# Patient Record
Sex: MALE | Race: WHITE | NOT HISPANIC OR LATINO | Employment: OTHER | URBAN - METROPOLITAN AREA
[De-identification: names, ages, dates, MRNs, and addresses within clinical notes are randomized per-mention and may not be internally consistent; named-entity substitution may affect disease eponyms.]

---

## 2017-02-19 ENCOUNTER — GENERIC CONVERSION - ENCOUNTER (OUTPATIENT)
Dept: OTHER | Facility: OTHER | Age: 62
End: 2017-02-19

## 2017-02-21 ENCOUNTER — GENERIC CONVERSION - ENCOUNTER (OUTPATIENT)
Dept: OTHER | Facility: OTHER | Age: 62
End: 2017-02-21

## 2017-03-21 ENCOUNTER — ALLSCRIPTS OFFICE VISIT (OUTPATIENT)
Dept: OTHER | Facility: OTHER | Age: 62
End: 2017-03-21

## 2017-03-21 DIAGNOSIS — D35.2 BENIGN NEOPLASM OF PITUITARY GLAND (HCC): ICD-10-CM

## 2017-03-22 ENCOUNTER — GENERIC CONVERSION - ENCOUNTER (OUTPATIENT)
Dept: OTHER | Facility: OTHER | Age: 62
End: 2017-03-22

## 2017-03-23 ENCOUNTER — GENERIC CONVERSION - ENCOUNTER (OUTPATIENT)
Dept: OTHER | Facility: OTHER | Age: 62
End: 2017-03-23

## 2017-03-23 LAB
LEFT EYE DIABETIC RETINOPATHY: NORMAL
RIGHT EYE DIABETIC RETINOPATHY: NORMAL

## 2017-03-31 ENCOUNTER — GENERIC CONVERSION - ENCOUNTER (OUTPATIENT)
Dept: OTHER | Facility: OTHER | Age: 62
End: 2017-03-31

## 2017-04-04 DIAGNOSIS — I10 ESSENTIAL (PRIMARY) HYPERTENSION: ICD-10-CM

## 2017-04-04 DIAGNOSIS — E11.9 TYPE 2 DIABETES MELLITUS WITHOUT COMPLICATIONS (HCC): ICD-10-CM

## 2017-04-04 DIAGNOSIS — D35.2 BENIGN NEOPLASM OF PITUITARY GLAND (HCC): ICD-10-CM

## 2017-04-04 DIAGNOSIS — N20.0 CALCULUS OF KIDNEY: ICD-10-CM

## 2017-04-04 DIAGNOSIS — E78.5 HYPERLIPIDEMIA: ICD-10-CM

## 2017-04-10 ENCOUNTER — GENERIC CONVERSION - ENCOUNTER (OUTPATIENT)
Dept: OTHER | Facility: OTHER | Age: 62
End: 2017-04-10

## 2017-04-17 ENCOUNTER — TRANSCRIBE ORDERS (OUTPATIENT)
Dept: ADMINISTRATIVE | Facility: HOSPITAL | Age: 62
End: 2017-04-17

## 2017-04-17 DIAGNOSIS — D35.3 BENIGN NEOPLASM OF PITUITARY GLAND AND CRANIOPHARYNGEAL DUCT (POUCH) (HCC): Primary | ICD-10-CM

## 2017-04-17 DIAGNOSIS — D35.2 BENIGN NEOPLASM OF PITUITARY GLAND AND CRANIOPHARYNGEAL DUCT (POUCH) (HCC): Primary | ICD-10-CM

## 2017-04-17 DIAGNOSIS — E22.9 PITUITARY HYPERFUNCTION (HCC): ICD-10-CM

## 2017-05-23 ENCOUNTER — GENERIC CONVERSION - ENCOUNTER (OUTPATIENT)
Dept: OTHER | Facility: OTHER | Age: 62
End: 2017-05-23

## 2017-05-23 LAB
A/G RATIO (HISTORICAL): 1.7 (ref 1.2–2.2)
ALBUMIN SERPL BCP-MCNC: 4.5 G/DL (ref 3.6–4.8)
ALP SERPL-CCNC: 54 IU/L (ref 39–117)
ALT SERPL W P-5'-P-CCNC: 25 IU/L (ref 0–44)
AMBIG ABBREV CMP14 DEFAULT (HISTORICAL): NORMAL
AMBIG ABBREV LP DEFAULT (HISTORICAL): NORMAL
AST SERPL W P-5'-P-CCNC: 17 IU/L (ref 0–40)
BILIRUB SERPL-MCNC: 0.4 MG/DL (ref 0–1.2)
BUN SERPL-MCNC: 21 MG/DL (ref 8–27)
BUN/CREA RATIO (HISTORICAL): 19 (ref 10–24)
CALCIUM SERPL-MCNC: 9.7 MG/DL (ref 8.6–10.2)
CHLORIDE SERPL-SCNC: 99 MMOL/L (ref 96–106)
CHOLEST SERPL-MCNC: 164 MG/DL (ref 100–199)
CO2 SERPL-SCNC: 21 MMOL/L (ref 18–29)
CREAT SERPL-MCNC: 1.13 MG/DL (ref 0.76–1.27)
EGFR AFRICAN AMERICAN (HISTORICAL): 81 ML/MIN/1.73
EGFR-AMERICAN CALC (HISTORICAL): 70 ML/MIN/1.73
GLUCOSE SERPL-MCNC: 152 MG/DL (ref 65–99)
HDLC SERPL-MCNC: 45 MG/DL
LDLC SERPL CALC-MCNC: 101 MG/DL (ref 0–99)
POTASSIUM SERPL-SCNC: 4.3 MMOL/L (ref 3.5–5.2)
SODIUM SERPL-SCNC: 138 MMOL/L (ref 134–144)
TOT. GLOBULIN, SERUM (HISTORICAL): 2.7 G/DL (ref 1.5–4.5)
TOTAL PROTEIN (HISTORICAL): 7.2 G/DL (ref 6–8.5)
TRIGL SERPL-MCNC: 89 MG/DL (ref 0–149)
VLDLC SERPL CALC-MCNC: 18 MG/DL (ref 5–40)

## 2017-05-24 LAB
COMMENT (HISTORICAL): NORMAL
CORTIS AM PEAK SERPL-SCNC: 7.5 UG/DL (ref 6.2–19.4)
PROLACTIN (HISTORICAL): 0.4 NG/ML (ref 4–15.2)
PTH-INTACT SERPL-MCNC: 31 PG/ML (ref 15–65)
TESTOSTERONE FREE (HISTORICAL): 8.9 PG/ML (ref 6.6–18.1)
TESTOSTERONE TOTAL (HISTORICAL): 425 NG/DL (ref 348–1197)

## 2017-05-25 ENCOUNTER — GENERIC CONVERSION - ENCOUNTER (OUTPATIENT)
Dept: OTHER | Facility: OTHER | Age: 62
End: 2017-05-25

## 2017-05-26 ENCOUNTER — HOSPITAL ENCOUNTER (OUTPATIENT)
Dept: MRI IMAGING | Facility: HOSPITAL | Age: 62
Discharge: HOME | End: 2017-05-26
Payer: COMMERCIAL

## 2017-05-26 DIAGNOSIS — D35.2 BENIGN NEOPLASM OF PITUITARY GLAND (HCC): ICD-10-CM

## 2017-05-26 PROCEDURE — 70553 MRI BRAIN STEM W/O & W/DYE: CPT

## 2017-05-26 PROCEDURE — A9585 GADOBUTROL INJECTION: HCPCS | Performed by: INTERNAL MEDICINE

## 2017-05-26 RX ADMIN — GADOBUTROL 8 ML: 604.72 INJECTION INTRAVENOUS at 08:59

## 2017-05-28 ENCOUNTER — GENERIC CONVERSION - ENCOUNTER (OUTPATIENT)
Dept: OTHER | Facility: OTHER | Age: 62
End: 2017-05-28

## 2017-06-08 ENCOUNTER — ALLSCRIPTS OFFICE VISIT (OUTPATIENT)
Dept: OTHER | Facility: OTHER | Age: 62
End: 2017-06-08

## 2017-06-08 ENCOUNTER — GENERIC CONVERSION - ENCOUNTER (OUTPATIENT)
Dept: OTHER | Facility: OTHER | Age: 62
End: 2017-06-08

## 2017-06-12 ENCOUNTER — GENERIC CONVERSION - ENCOUNTER (OUTPATIENT)
Dept: OTHER | Facility: OTHER | Age: 62
End: 2017-06-12

## 2017-06-13 LAB
HEP C RNA QUAL (HISTORICAL): <0.1 S/CO RATIO (ref 0–0.9)
INTERPRETATION (HISTORICAL): NORMAL

## 2017-06-15 ENCOUNTER — GENERIC CONVERSION - ENCOUNTER (OUTPATIENT)
Dept: OTHER | Facility: OTHER | Age: 62
End: 2017-06-15

## 2017-06-22 ENCOUNTER — GENERIC CONVERSION - ENCOUNTER (OUTPATIENT)
Dept: OTHER | Facility: OTHER | Age: 62
End: 2017-06-22

## 2017-07-21 ENCOUNTER — GENERIC CONVERSION - ENCOUNTER (OUTPATIENT)
Dept: OTHER | Facility: OTHER | Age: 62
End: 2017-07-21

## 2017-10-01 ENCOUNTER — ANESTHESIA EVENT (OUTPATIENT)
Dept: PERIOP | Facility: AMBULARY SURGERY CENTER | Age: 62
End: 2017-10-01
Payer: COMMERCIAL

## 2017-10-26 RX ORDER — REPAGLINIDE 2 MG/1
2 TABLET ORAL
COMMUNITY
End: 2020-11-13 | Stop reason: ALTCHOICE

## 2017-10-26 RX ORDER — INSULIN GLARGINE 100 [IU]/ML
20 INJECTION, SOLUTION SUBCUTANEOUS
COMMUNITY
End: 2022-02-07 | Stop reason: SDUPTHER

## 2017-10-26 RX ORDER — FENOFIBRATE 134 MG/1
134 CAPSULE ORAL
COMMUNITY
End: 2018-07-06 | Stop reason: SDUPTHER

## 2017-10-26 RX ORDER — TAMSULOSIN HYDROCHLORIDE 0.4 MG/1
0.4 CAPSULE ORAL
COMMUNITY
End: 2018-10-03 | Stop reason: ALTCHOICE

## 2017-10-26 RX ORDER — HYDROCHLOROTHIAZIDE 25 MG/1
25 TABLET ORAL
COMMUNITY
End: 2018-10-03 | Stop reason: ALTCHOICE

## 2017-10-26 RX ORDER — TELMISARTAN 80 MG/1
80 TABLET ORAL
COMMUNITY
End: 2018-11-19 | Stop reason: SDUPTHER

## 2017-10-26 RX ORDER — LATANOPROST 50 UG/ML
1 SOLUTION/ DROPS OPHTHALMIC
COMMUNITY

## 2017-10-26 NOTE — PRE-PROCEDURE INSTRUCTIONS
Pre-Surgery Instructions:   Medication Instructions    aspirin (ECOTRIN) 325 mg EC tablet Patient was instructed by Physician and understands   fenofibrate micronized (LOFIBRA) 134 MG capsule Instructed patient per Anesthesia Guidelines   hydrochlorothiazide (HYDRODIURIL) 25 mg tablet Instructed patient per Anesthesia Guidelines   insulin glargine (LANTUS) 100 units/mL subcutaneous injection Instructed patient per Anesthesia Guidelines   latanoprost (XALATAN) 0 005 % ophthalmic solution Instructed patient per Anesthesia Guidelines   Liraglutide (VICTOZA SC) Patient was instructed to contact Physician for medication instruction   METFORMIN HCL ER PO Patient was instructed to contact Physician for medication instruction   repaglinide (PRANDIN) 2 mg tablet Patient was instructed to contact Physician for medication instruction   tamsulosin (FLOMAX) 0 4 mg Instructed patient per Anesthesia Guidelines   telmisartan (MICARDIS) 80 MG tablet Instructed patient per Anesthesia Guidelines      Pre op instructions given-instructed to follow Dr Kandice Barnhart instructions including bowel prep

## 2017-10-27 ENCOUNTER — ANESTHESIA (OUTPATIENT)
Dept: PERIOP | Facility: AMBULARY SURGERY CENTER | Age: 62
End: 2017-10-27
Payer: COMMERCIAL

## 2017-10-27 ENCOUNTER — GENERIC CONVERSION - ENCOUNTER (OUTPATIENT)
Dept: OTHER | Facility: OTHER | Age: 62
End: 2017-10-27

## 2017-10-27 ENCOUNTER — HOSPITAL ENCOUNTER (OUTPATIENT)
Facility: AMBULARY SURGERY CENTER | Age: 62
Setting detail: OUTPATIENT SURGERY
Discharge: HOME | End: 2017-10-27
Attending: COLON & RECTAL SURGERY | Admitting: COLON & RECTAL SURGERY
Payer: COMMERCIAL

## 2017-10-27 VITALS
TEMPERATURE: 97.6 F | SYSTOLIC BLOOD PRESSURE: 135 MMHG | BODY MASS INDEX: 25.77 KG/M2 | RESPIRATION RATE: 16 BRPM | DIASTOLIC BLOOD PRESSURE: 77 MMHG | WEIGHT: 174 LBS | HEIGHT: 69 IN | OXYGEN SATURATION: 97 % | HEART RATE: 89 BPM

## 2017-10-27 LAB
GLUCOSE SERPL-MCNC: 125 MG/DL (ref 65–140)
GLUCOSE SERPL-MCNC: 160 MG/DL (ref 65–140)

## 2017-10-27 PROCEDURE — 82948 REAGENT STRIP/BLOOD GLUCOSE: CPT

## 2017-10-27 RX ORDER — PROPOFOL 10 MG/ML
INJECTION, EMULSION INTRAVENOUS AS NEEDED
Status: DISCONTINUED | OUTPATIENT
Start: 2017-10-27 | End: 2017-10-27 | Stop reason: SURG

## 2017-10-27 RX ORDER — LIDOCAINE HYDROCHLORIDE 10 MG/ML
INJECTION, SOLUTION INFILTRATION; PERINEURAL AS NEEDED
Status: DISCONTINUED | OUTPATIENT
Start: 2017-10-27 | End: 2017-10-27 | Stop reason: SURG

## 2017-10-27 RX ORDER — SODIUM CHLORIDE, SODIUM LACTATE, POTASSIUM CHLORIDE, CALCIUM CHLORIDE 600; 310; 30; 20 MG/100ML; MG/100ML; MG/100ML; MG/100ML
125 INJECTION, SOLUTION INTRAVENOUS CONTINUOUS
Status: DISCONTINUED | OUTPATIENT
Start: 2017-10-27 | End: 2017-10-27 | Stop reason: HOSPADM

## 2017-10-27 RX ADMIN — PROPOFOL 20 MG: 10 INJECTION, EMULSION INTRAVENOUS at 09:18

## 2017-10-27 RX ADMIN — SODIUM CHLORIDE, SODIUM LACTATE, POTASSIUM CHLORIDE, AND CALCIUM CHLORIDE 125 ML/HR: .6; .31; .03; .02 INJECTION, SOLUTION INTRAVENOUS at 08:04

## 2017-10-27 RX ADMIN — PROPOFOL 20 MG: 10 INJECTION, EMULSION INTRAVENOUS at 09:25

## 2017-10-27 RX ADMIN — LIDOCAINE HYDROCHLORIDE 50 MG: 10 INJECTION, SOLUTION INFILTRATION; PERINEURAL at 09:10

## 2017-10-27 RX ADMIN — PROPOFOL 30 MG: 10 INJECTION, EMULSION INTRAVENOUS at 09:11

## 2017-10-27 RX ADMIN — PROPOFOL 20 MG: 10 INJECTION, EMULSION INTRAVENOUS at 09:15

## 2017-10-27 RX ADMIN — SODIUM CHLORIDE, SODIUM LACTATE, POTASSIUM CHLORIDE, AND CALCIUM CHLORIDE: .6; .31; .03; .02 INJECTION, SOLUTION INTRAVENOUS at 08:37

## 2017-10-27 RX ADMIN — PROPOFOL 20 MG: 10 INJECTION, EMULSION INTRAVENOUS at 09:12

## 2017-10-27 RX ADMIN — PROPOFOL 100 MG: 10 INJECTION, EMULSION INTRAVENOUS at 09:10

## 2017-10-27 NOTE — OP NOTE
OPERATIVE REPORT  PATIENT NAME: Zehra Regan    :  1955  MRN: [id number]  Pt Location: AN  GI ROOM 01    SURGERY DATE: 10/27/2017    Surgeon(s) and Role:     * Tati Maynard MD - Primary    Preop Diagnosis:  Family history of malignant neoplasm of digestive organ [Z80 0]    Post-Op Diagnosis Codes:     * Family history of malignant neoplasm of digestive organ [Z80 0]    Procedure(s) (LRB):  COLONOSCOPY (N/A)    Specimen(s):  * No specimens in log *    Estimated Blood Loss:   Minimal    Drains:       Anesthesia Type:   IV Sedation with Anesthesia    Operative Indications:  Family history of malignant neoplasm of digestive organ [Z80 0]      Operative Findings:  Diverticular disease, otherwise normal colonoscopy    Complications:   None    Procedure and Technique:  Colonoscopy Procedure Note    Procedure: Colonoscopy --screening    Pre-operative Diagnosis: high family cancer risk    Post-operative Diagnosis: same    Indications: family history of colon cancer    Sedation: anesthesia      ASA Class: none    Procedure Details     Informed consent was obtained for the procedure, including sedation  Risks of perforation, hemorrhage, adverse drug reaction and aspiration were discussed  The patient was placed in the left lateral decubitus position  Based on the pre-procedure assessment, including review of the patient's medical history, medications, allergies, and review of systems, he had been deemed to be an appropriate candidate for conscious sedation; he was therefore sedated with the medications listed below  The patient was monitored continuously with ECG tracing, pulse oximetry, blood pressure monitoring, and direct observations  A rectal examination was performed  Prostate soft and smooth and no nodularity  The pediatric colonoscope was inserted into the rectum and advanced under direct vision to the cecum, which was identified by the appendiceal orifice    The quality of the colonic preparation was excellent  A careful inspection was made as the colonoscope was withdrawn, including a retroflexed view of the rectum; findings and interventions are described below  Appropriate photodocumentation was obtained  Findings:  -normal colonic mucosa throughout  -diverticulosis, mild in degree, involving the sigmoid    Specimens: none           Complications:  None; patient tolerated the procedure well  Disposition: PACU            Condition: stable    Attending Attestation: I was present for the entire procedure    Impression:    -Mild colonic diverticulosis involving the sigmoid  ,   Prostate mildly enlarged soft and smooth with no nodularity  -Otherwise normal colonoscopy to the cecum  Recommendations:  -Repeat colonoscopy in 5 years  call if any problem,   call if there is any signs or symptoms of abdominal pain, diverticulitis or diverticular bleed  -High fiber diet       I was present for the entire procedure    Patient Disposition:  PACU     SIGNATURE: Emmie Inman MD  DATE: October 27, 2017  TIME: 9:29 AM

## 2017-10-27 NOTE — H&P
History and Physical   Colon and Rectal Surgery   Jocelyne Allen 65 y o  male MRN: [id number]  Unit/Bed#: OR [place] Encounter: [id number]  10/27/17   8:58 AM      No chief complaint on file  History of Present Illness   HPI:  Jocelyne Allen is a 65 y o  male who presents with high family colon cancer risk  Historical Information   Past Medical History:   Diagnosis Date    CPAP (continuous positive airway pressure) dependence     lost weight doesnt need anymore    Diabetes mellitus ([address] )     Hyperlipidemia     Hypertension     Kidney stone     Sleep apnea     lost weight and doesnt need cpap     Past Surgical History:   Procedure Laterality Date    APPENDECTOMY      COLONOSCOPY      FRACTURE SURGERY      elbow    HERNIA REPAIR      KIDNEY STONE SURGERY Left        Meds/Allergies     Prescriptions Prior to Admission   Medication    aspirin (ECOTRIN) 325 mg EC tablet    fenofibrate micronized (LOFIBRA) 134 MG capsule    hydrochlorothiazide (HYDRODIURIL) 25 mg tablet    insulin glargine (LANTUS) 100 units/mL subcutaneous injection    latanoprost (XALATAN) 0 005 % ophthalmic solution    Liraglutide (VICTOZA SC)    METFORMIN HCL ER PO    repaglinide (PRANDIN) 2 mg tablet    tamsulosin (FLOMAX) 0 4 mg    telmisartan (MICARDIS) 80 MG tablet         Current Facility-Administered Medications:     lactated ringers infusion, 125 mL/hr, Intravenous, Continuous, Gill Mendenhall MD, Last Rate: 125 mL/hr at 10/27/17 0804, 125 mL/hr at 10/27/17 0804    No Known Allergies      Social History   History   Alcohol Use No     History   Drug Use No     History   Smoking Status    Never Smoker   Smokeless Tobacco    Never Used         Family History: History reviewed  No pertinent family history        Objective     Current Vitals:   Blood Pressure: 141/78 (10/27/17 0800)  Pulse: 103 (ST) (10/27/17 0800)  Temperature: 98 °F (36 7 °C) (10/27/17 0800)  Temp Source: Temporal (10/27/17 0800)  Respirations: 18 (10/27/17 0800)  Height: 5' 9" (175 3 cm) (10/27/17 0800)  Weight - Scale: 78 9 kg (174 lb) (10/27/17 0800)  SpO2: 99 % (10/27/17 0800)  No intake or output data in the 24 hours ending 10/27/17 0858    Physical Exam:  General: No acute distress  Eyes: Normal   ENT: Normal   Neck: No JVD  Pulm: Normal in A&P  CV: NSR no murmur  Abdomen: Soft and normal on palpation, no mass, no tenderness, no guarding  Rectal: Normal sphincter tone, no perianal skin lesions  Extremities: Normal  Lymphatics: Normal        Lab Results: I have personally reviewed pertinent lab results  Imaging: I have personally reviewed pertinent reports  Patient was consented by myself for procedure as explained earlier with all the risks and benefits described  All questions answered  ASSESSMENT:  Tabitha Brannon is a 65 y o  male who presents with high family risk        PLAN:    colonoscopy

## 2017-10-27 NOTE — ANESTHESIA PREPROCEDURE EVALUATION
Review of Systems/Medical History  Patient summary reviewed  Chart reviewed      Cardiovascular  Hyperlipidemia, Hypertension controlled,    Pulmonary  Sleep apnea (remote history, not active) , ,        GI/Hepatic    GERD (occ GERD symptoms, none today) , Bowel prep  Comment: fam hx of colon ca     Kidney stones (left kidney stone 1 yr ago),        Endo/Other  Diabetes (recently diagnosied, on oral hypoglycemics and lantus) Insulin,   Comment: Pituitary microadenoma, hyperprolactinemia   GYN  Negative gynecology ROS          Hematology  Negative hematology ROS      Musculoskeletal  Negative musculoskeletal ROS        Neurology  Negative neurology ROS      Psychology   Negative psychology ROS            Physical Exam    Airway       Dental   Comment: Upper and lower secure caps (molar #18),     Cardiovascular      Pulmonary      Other Findings        Anesthesia Plan  ASA Score- 2       Anesthesia Type- IV sedation with anesthesia with ASA Monitors  Additional Monitors:   Airway Plan:           Induction- intravenous  Informed Consent- Anesthetic plan and risks discussed with patient  I personally reviewed this patient with the CRNA  Discussed and agreed on the Anesthesia Plan with the CRNA  Alon Torres

## 2017-10-27 NOTE — ANESTHESIA POSTPROCEDURE EVALUATION
Post-Op Assessment Note      CV Status:  Stable    Mental Status:  Lethargic    Hydration Status:  Stable and euvolemic    PONV Controlled:  None    Airway Patency:  Patent    Post Op Vitals Reviewed: Yes          Staff: CRNA       Comments: vss          BP   90/50   Temp      Pulse  80   Resp   16   SpO2   100

## 2017-11-17 ENCOUNTER — GENERIC CONVERSION - ENCOUNTER (OUTPATIENT)
Dept: OTHER | Facility: OTHER | Age: 62
End: 2017-11-17

## 2018-01-11 NOTE — RESULT NOTES
Discussion/Summary   Hepatitis C shows no evidence of infection     Verified Results  (LC) HCV Antibody reflex to LEONOR [id number] 02:40PM Fatmata Ferrera     Test Name Result Flag Reference   HCV Ab Hepatitis C virus Ab Signal/Cutoff <0 1 s/co ratio  0 0-0 9   Interpretation: Comment     Negative  Not infected with HCV, unless recent infection is suspected or other  evidence exists to indicate HCV infection

## 2018-01-13 VITALS
SYSTOLIC BLOOD PRESSURE: 110 MMHG | DIASTOLIC BLOOD PRESSURE: 74 MMHG | WEIGHT: 182.2 LBS | HEART RATE: 86 BPM | BODY MASS INDEX: 29.28 KG/M2 | TEMPERATURE: 96.2 F | RESPIRATION RATE: 16 BRPM | HEIGHT: 66 IN

## 2018-01-14 VITALS
WEIGHT: 175.5 LBS | HEIGHT: 66 IN | SYSTOLIC BLOOD PRESSURE: 122 MMHG | RESPIRATION RATE: 18 BRPM | TEMPERATURE: 96.8 F | BODY MASS INDEX: 28.21 KG/M2 | HEART RATE: 88 BPM | DIASTOLIC BLOOD PRESSURE: 86 MMHG

## 2018-01-14 NOTE — RESULT NOTES
Discussion/Summary   MRI shows improvement - at htis point we can decrease the medication and wean off of it  Verified Results  * MRI BRAIN PITUITARY WO AND W CONTRAST [id number] 07:44AM Carlota FREITAS Order Number: [id number]    - Patient Instructions: To schedule this appointment, please contact Central Scheduling at [phone]  Test Name Result Flag Reference   MRI BRAIN PITUITARY WO AND W CONTRAST (Report)     This is a summary report  The complete report is available in the patient's medical record  If you cannot access the medical record, please contact the sending organization for a detailed fax or copy  MRI BRAIN AND SELLA WITH AND WITHOUT CONTRAST     INDICATION: Microadenoma, surveillance     COMPARISON: MR 7/17/2015     TECHNIQUE:   Brain: Sagittal T1, axial T2  Axial FLAIR  Axial T1, Axial [place], Axial DWI  Axial T1 post contrast   Axial BRAVO post contrast       Sella: Sagittal T1, Coronal T1 pre-and-post contrast, coronal post contrast dynamic imaging  Coronal T2  Sagittal T1 post contrast    Targeted images of the sella were performed requiring additional time at acquisition and interpretation of approximately 25%     IV Contrast: 8 mL of gadobutrol injection (MULTI-DOSE)      IMAGE QUALITY: Diagnostic  FINDINGS:     BRAIN PARENCHYMA: There is no discrete mass, mass effect or midline shift  No abnormal white matter signal identified  Brainstem and cerebellum demonstrate normal signal  There is no intracranial hemorrhage  There is no evidence of acute infarction and   diffusion imaging is unremarkable  Postcontrast imaging is normal      VENTRICLES: Normal      SELLA AND PITUITARY GLAND: Tiny hypoenhancing focus in the posterior left paramedian glandular tissue is becoming increasingly less conspicuous   Without the findings on prior studies, this would be considered normal      ORBITS: Normal      PARANASAL SINUSES: Small retention cyst or polyps in both maxillary sinuses  Trace mucosal thickening in ethmoid air cells  VASCULATURE: Evaluation of the major intracranial vasculature demonstrates appropriate flow voids  CALVARIUM AND SKULL BASE: Normal      EXTRACRANIAL SOFT TISSUES: Normal        IMPRESSION:     Stable MR appearance the brain, no acute disease  Tiny hypoenhancing focus previously described within the pituitary glandular tissue is no longer apparent  Workstation performed: [id number]     Signed by:    Suma Alvarez MD   5/26/17

## 2018-01-15 NOTE — RESULT NOTES
Discussion/Summary   Labs look very good- blood sugar is 152 - at this point we may want to decrease the cabergoline to only once a week and even 1/2 a tab a week since the level is perfect  Verified Results  (1) COMPREHENSIVE METABOLIC PANEL [id number] 87:94GT [facility]     Test Name Result Flag Reference   Glucose, Serum 152 mg/dL H 65-99   BUN 21 mg/dL  8-27   Creatinine, Serum 1 13 mg/dL  0 76-1 27   BUN/Creatinine Ratio 19  10-24   Sodium, Serum 138 mmol/L  134-144   Potassium, Serum 4 3 mmol/L  3 5-5 2   Chloride, Serum 99 mmol/L     Carbon Dioxide, Total 21 mmol/L  18-29   Calcium, Serum 9 7 mg/dL  8 6-10 2   Protein, Total, Serum 7 2 g/dL  6 0-8 5   Albumin, Serum 4 5 g/dL  3 6-4 8   Globulin, Total 2 7 g/dL  1 5-4 5   A/G Ratio 1 7  1 2-2 2   Bilirubin, Total 0 4 mg/dL  0 0-1 2   Alkaline Phosphatase, S 54 IU/L     AST (SGOT) 17 IU/L  0-40   ALT (SGPT) 25 IU/L  0-44   eGFR If NonAfricn Am 70 mL/min/1 73  >59   eGFR If Africn Am 81 mL/min/1 73  >59     (LC) Lipid Panel [id number] 10:01AM [facility]     Test Name Result Flag Reference   Cholesterol, Total 164 mg/dL  100-199   Triglycerides 89 mg/dL  0-149   HDL Cholesterol 45 mg/dL  >39   VLDL Cholesterol [place] 18 mg/dL  5-40   LDL Cholesterol Calc 101 mg/dL H 0-99     (LC) Testosterone,Free and Total [id number] 10:01AM [facility]     Test Name Result Flag Reference   Testosterone, Serum 425 ng/dL  636-7170   Comment: Comment     Adult male reference interval is based on a population of lean males  up to 36years old     Free Testosterone(Direct) 8 9 pg/mL  6 6-18 1     (1) PROLACTIN [id number] 10:01AM [facility]     Test Name Result Flag Reference   Prolactin 0 4 ng/mL L 4 0-15 2     (1) PTH N-TERMINAL (INTACT) [id number] 10:01AM [facility]     Test Name Result Flag Reference   PTH, Intact 31 pg/mL  15-65     (1) CORTISOL AM SPECIMEN [id number] 10:01AM [facility]     Test Name Result Flag Reference   Cortisol - AM 7 5 ug/dL  6 2-19 4     [facility]) Michel Mendoza CMP14 Default [id number] 10:01AM Leo Jurado     Test Name Result Flag Reference   Michel Mendoza CMP14 Default Comment     A hand-written panel/profile was received from your office  In  accordance with the [facility] Ambiguous Test Code Policy dated July 5780, we have completed your order by using the closest currently  or formerly recognized AMA panel  We have assigned Comprehensive  Metabolic Panel (14), Test Code #786965 to this request   If this  is not the testing you wished to receive on this specimen, please  contact the [address] [place] [address] Client Inquiry/Technical Services Department  to clarify the test order  We appreciate your business  [facility]) Michel Prakashfelipa LP Default [id number] 10:01AM Leo Rhiannon     Test Name Result Flag Reference   Kevin Nelliev LP Default Comment     A hand-written panel/profile was received from your office  In  accordance with the [facility] Ambiguous Test Code Policy dated July 6071, we have completed your order by using the closest currently  or formerly recognized AMA panel  We have assigned Lipid Panel,  Test Code #240845 to this request  If this is not the testing you  wished to receive on this specimen, please contact the [address] [place] [address]  Client Inquiry/Technical Services Department to clarify the test  order  We appreciate your business

## 2018-06-08 DIAGNOSIS — D35.2 BENIGN NEOPLASM OF PITUITARY GLAND (HCC): ICD-10-CM

## 2018-07-06 DIAGNOSIS — E78.2 MIXED HYPERLIPIDEMIA: Primary | ICD-10-CM

## 2018-07-06 RX ORDER — FENOFIBRATE 134 MG/1
CAPSULE ORAL
Qty: 30 CAPSULE | Refills: 5 | Status: SHIPPED | OUTPATIENT
Start: 2018-07-06 | End: 2018-11-05 | Stop reason: SDUPTHER

## 2018-07-09 ENCOUNTER — TELEPHONE (OUTPATIENT)
Dept: FAMILY MEDICINE CLINIC | Facility: CLINIC | Age: 63
End: 2018-07-09

## 2018-07-09 NOTE — TELEPHONE ENCOUNTER
Pt called requesting order for bw so he can have it done before his apt with you  He wanted to make sure the hormone panel was ordered and any other bw you would want him to have done  Please Advise  Patient would like a call when order is put through and would like it mailed to address

## 2018-07-11 DIAGNOSIS — Z12.5 SCREENING FOR MALIGNANT NEOPLASM OF PROSTATE: Primary | ICD-10-CM

## 2018-07-11 DIAGNOSIS — R89.9 ABNORMAL LABORATORY TEST: ICD-10-CM

## 2018-07-11 DIAGNOSIS — Z13.6 SCREENING FOR CARDIOVASCULAR CONDITION: ICD-10-CM

## 2018-08-15 ENCOUNTER — TELEPHONE (OUTPATIENT)
Dept: FAMILY MEDICINE CLINIC | Facility: CLINIC | Age: 63
End: 2018-08-15

## 2018-08-15 NOTE — TELEPHONE ENCOUNTER
Pt called requesting if you can add SHBG to his blood work  Please advise  Patient would like a return call

## 2018-08-16 NOTE — TELEPHONE ENCOUNTER
He gave the full name yesterday with explanation SHBG is a sex hormone binding globulin which he has been advised is an especially important test for diabetics to get

## 2018-08-16 NOTE — TELEPHONE ENCOUNTER
Left message for patient to call back  He does see Endo at [facility]- he should ask them to order it

## 2018-09-07 LAB
ALBUMIN SERPL-MCNC: 4.4 G/DL (ref 3.6–4.8)
ALBUMIN/GLOB SERPL: 1.8 {RATIO} (ref 1.2–2.2)
ALP SERPL-CCNC: 39 IU/L (ref 39–117)
ALT SERPL-CCNC: 35 IU/L (ref 0–44)
AMBIG ABBREV DEFAULT: NORMAL
AMBIG ABBREV DEFAULT: NORMAL
AST SERPL-CCNC: 21 IU/L (ref 0–40)
BILIRUB SERPL-MCNC: 0.5 MG/DL (ref 0–1.2)
BUN SERPL-MCNC: 17 MG/DL (ref 8–27)
BUN/CREAT SERPL: 13 (ref 10–24)
CALCIUM SERPL-MCNC: 9.9 MG/DL (ref 8.6–10.2)
CHLORIDE SERPL-SCNC: 98 MMOL/L (ref 96–106)
CHOLEST SERPL-MCNC: 144 MG/DL (ref 100–199)
CO2 SERPL-SCNC: 23 MMOL/L (ref 20–29)
CREAT SERPL-MCNC: 1.26 MG/DL (ref 0.76–1.27)
GLOBULIN SER-MCNC: 2.5 G/DL (ref 1.5–4.5)
GLUCOSE SERPL-MCNC: 84 MG/DL (ref 65–99)
HDLC SERPL-MCNC: 39 MG/DL
LDLC SERPL CALC-MCNC: 78 MG/DL (ref 0–99)
POTASSIUM SERPL-SCNC: 3.8 MMOL/L (ref 3.5–5.2)
PROLACTIN SERPL-MCNC: 21 NG/ML (ref 4–15.2)
PROT SERPL-MCNC: 6.9 G/DL (ref 6–8.5)
PSA SERPL-MCNC: 4.5 NG/ML (ref 0–4)
SL AMB EGFR AFRICAN AMERICAN: 70 ML/MIN/1.73
SL AMB EGFR NON AFRICAN AMERICAN: 61 ML/MIN/1.73
SL AMB VLDL CHOLESTEROL CALC: 27 MG/DL (ref 5–40)
SODIUM SERPL-SCNC: 140 MMOL/L (ref 134–144)
TESTOST FREE SERPL-MCNC: 7.2 PG/ML (ref 6.6–18.1)
TESTOST SERPL-MCNC: 405 NG/DL (ref 264–916)
TRIGL SERPL-MCNC: 133 MG/DL (ref 0–149)

## 2018-10-03 ENCOUNTER — OFFICE VISIT (OUTPATIENT)
Dept: FAMILY MEDICINE CLINIC | Facility: CLINIC | Age: 63
End: 2018-10-03
Payer: COMMERCIAL

## 2018-10-03 ENCOUNTER — TELEPHONE (OUTPATIENT)
Dept: FAMILY MEDICINE CLINIC | Facility: CLINIC | Age: 63
End: 2018-10-03

## 2018-10-03 VITALS
WEIGHT: 177 LBS | RESPIRATION RATE: 17 BRPM | TEMPERATURE: 98.2 F | HEIGHT: 69 IN | HEART RATE: 100 BPM | SYSTOLIC BLOOD PRESSURE: 118 MMHG | DIASTOLIC BLOOD PRESSURE: 82 MMHG | BODY MASS INDEX: 26.22 KG/M2

## 2018-10-03 DIAGNOSIS — I10 BENIGN ESSENTIAL HYPERTENSION: ICD-10-CM

## 2018-10-03 DIAGNOSIS — Z23 NEED FOR PNEUMOCOCCAL VACCINATION: ICD-10-CM

## 2018-10-03 DIAGNOSIS — Z23 NEED FOR IMMUNIZATION AGAINST INFLUENZA: ICD-10-CM

## 2018-10-03 DIAGNOSIS — Z79.4 TYPE 2 DIABETES MELLITUS WITH COMPLICATION, WITH LONG-TERM CURRENT USE OF INSULIN (HCC): ICD-10-CM

## 2018-10-03 DIAGNOSIS — E22.9 PITUITARY MICROADENOMA WITH HYPERPROLACTINEMIA (HCC): Primary | ICD-10-CM

## 2018-10-03 DIAGNOSIS — D35.2 PITUITARY MICROADENOMA WITH HYPERPROLACTINEMIA (HCC): Primary | ICD-10-CM

## 2018-10-03 DIAGNOSIS — R97.20 ELEVATED PSA: ICD-10-CM

## 2018-10-03 DIAGNOSIS — E78.5 DYSLIPIDEMIA: ICD-10-CM

## 2018-10-03 DIAGNOSIS — E11.8 TYPE 2 DIABETES MELLITUS WITH COMPLICATION, WITH LONG-TERM CURRENT USE OF INSULIN (HCC): ICD-10-CM

## 2018-10-03 PROBLEM — IMO0002 DM (DIABETES MELLITUS), TYPE 2, UNCONTROLLED: Status: ACTIVE | Noted: 2017-02-19

## 2018-10-03 PROBLEM — N20.0 NEPHROLITHIASIS: Status: ACTIVE | Noted: 2017-02-19

## 2018-10-03 LAB — SL AMB POCT HEMOGLOBIN AIC: 6.4

## 2018-10-03 PROCEDURE — 90472 IMMUNIZATION ADMIN EACH ADD: CPT | Performed by: FAMILY MEDICINE

## 2018-10-03 PROCEDURE — 90682 RIV4 VACC RECOMBINANT DNA IM: CPT | Performed by: FAMILY MEDICINE

## 2018-10-03 PROCEDURE — 1036F TOBACCO NON-USER: CPT | Performed by: FAMILY MEDICINE

## 2018-10-03 PROCEDURE — 90732 PPSV23 VACC 2 YRS+ SUBQ/IM: CPT | Performed by: FAMILY MEDICINE

## 2018-10-03 PROCEDURE — 83036 HEMOGLOBIN GLYCOSYLATED A1C: CPT | Performed by: FAMILY MEDICINE

## 2018-10-03 PROCEDURE — 3044F HG A1C LEVEL LT 7.0%: CPT | Performed by: FAMILY MEDICINE

## 2018-10-03 PROCEDURE — 99214 OFFICE O/P EST MOD 30 MIN: CPT | Performed by: FAMILY MEDICINE

## 2018-10-03 PROCEDURE — 90471 IMMUNIZATION ADMIN: CPT | Performed by: FAMILY MEDICINE

## 2018-10-03 RX ORDER — CHOLESTYRAMINE LIGHT 4 G/5.7G
4 POWDER, FOR SUSPENSION ORAL 2 TIMES DAILY WITH MEALS
COMMUNITY
End: 2020-11-13 | Stop reason: ALTCHOICE

## 2018-10-03 RX ORDER — CHLORTHALIDONE 25 MG/1
25 TABLET ORAL DAILY
COMMUNITY

## 2018-10-03 NOTE — ASSESSMENT & PLAN NOTE
Stopped weekly Cabergoline 0 25mg in August 2017 after having very low levels for 7 years  MRI in November 2017 showed prolactinoma was almost completely resolved     Prolactin now elevated to 21, recheck brain MRI, discuss with Bre given he is seeing them for DM2

## 2018-10-03 NOTE — TELEPHONE ENCOUNTER
They need to be within the last 6 weeks  He will miss the cut off & they want him to repeat labs for MRI

## 2018-10-03 NOTE — ASSESSMENT & PLAN NOTE
Lab Results   Component Value Date    HGBA1C 6 4 10/03/2018    Following with Endo, on Victoza, Metformin, Prandin and Lantus  Diabetic Foot Exam performed today

## 2018-10-03 NOTE — ASSESSMENT & PLAN NOTE
Lab Results   Component Value Date    PSA 4 5 (H) 09/04/2018     Never been elevated before, already established with Urology for kidney stones  -Re-establish with Urology for further workup

## 2018-10-03 NOTE — TELEPHONE ENCOUNTER
He just had recent labs done about 1 month ago, are there specific labs that they need for the MRI or is he requesting follow up bloodwork for a 6 month followup?

## 2018-10-03 NOTE — PROGRESS NOTES
FAMILY MEDICINE PROGRESS NOTE  Daniel Treadwell 59 y o  male   DATE: October 3, 2018     ASSESSMENT and PLAN:  Daniel Treadwell is a 59 y o  male with:     Elevated PSA  Lab Results   Component Value Date    PSA 4 5 (H) 09/04/2018     Never been elevated before, already established with Urology for kidney stones  -Re-establish with Urology for further workup    Pituitary microadenoma with hyperprolactinemia ([address] )  Stopped weekly Cabergoline 0 25mg in August 2017 after having very low levels for 7 years  MRI in November 2017 showed prolactinoma was almost completely resolved  Prolactin now elevated to 21, recheck brain MRI, discuss with Endo given he is seeing them for DM2    Type 2 diabetes mellitus with complication, with long-term current use of insulin ([address] )  Lab Results   Component Value Date    HGBA1C 6 4 10/03/2018    Following with Endo, on Victoza, Metformin, Prandin and Lantus  Diabetic Foot Exam performed today  SUBJECTIVE:  Daniel Treadwell is a 59 y o  male who presents today with a chief complaint of Hypertension (follow up with labs) and Diabetes Type 2  Pt is here for follow-up of chornic conditions:    HTN- on meds for the kidney stones which hasnt been working,     DM2- Managed by Dr Holly Crooks ([facility] [address] ) - 2x/year, has an appt later this year  Elevated PSA- never been elevated before  Family history of cancer  Kidney stone- Has hypercalciuria that causes kidney stones, on diuretics to help with the symptoms  Saw Urologist who then recommended Nephrology  Is on a low sodium diet, Prevalite, Lugols solution for iodine  Prolactinoma- had an MRI last year that said it was pretty much gone  He had been on  Cabergoline for years in the past  He has not been on it for months  Had seen Endocrinology for it years ago  Sex Hormone Binding Globulin- 43 6 (WNL)      Review of Systems   Constitutional: Negative for chills and fever     Respiratory: Negative for cough and shortness of breath  Cardiovascular: Negative for chest pain and palpitations  I have reviewed the patient's PMH, Social History, Medication List and Allergies as appropriate  OBJECTIVE:  /82 (BP Location: Left arm, Patient Position: Sitting, Cuff Size: Large)   Pulse 100   Temp 98 2 °F (36 8 °C)   Resp 17   Ht 5' 9" (1 753 m)   Wt 80 3 kg (177 lb)   BMI 26 14 kg/m²    Physical Exam   Constitutional: He appears well-developed and well-nourished  No distress  Cardiovascular: Normal rate, regular rhythm and normal heart sounds  Pulses are no weak pulses  Pulmonary/Chest: Effort normal and breath sounds normal  No respiratory distress  He has no wheezes  He has no rales  Feet:   Right Foot:   Skin Integrity: Negative for ulcer, skin breakdown, erythema, warmth, callus or dry skin  Left Foot:   Skin Integrity: Negative for ulcer, skin breakdown, erythema, warmth, callus or dry skin  Skin: He is not diaphoretic  Vitals reviewed  Patient's shoes and socks removed  Right Foot/Ankle   Right Foot Inspection  Skin Exam: skin normal and skin intact no dry skin, no warmth, no callus, no erythema, no maceration, no abnormal color, no pre-ulcer, no ulcer and no callus                          Toe Exam: ROM and strength within normal limits  Sensory       Monofilament testing: intact  Vascular  Capillary refills: < 3 seconds      Left Foot/Ankle  Left Foot Inspection  Skin Exam: skin normal and skin intactno dry skin, no warmth, no erythema, no maceration, normal color, no pre-ulcer, no ulcer and no callus                         Toe Exam: ROM and strength within normal limits                   Sensory       Monofilament: intact  Vascular  Capillary refills: < 3 seconds    Assign Risk Category:  No deformity present; No loss of protective sensation;  No weak pulses       Risk: 0        Labs:  Office Visit on 10/03/2018   Component Date Value Ref Range Status    Hemoglobin A1C 10/03/2018 6 4   Final    Hemoglobin A1C 6 4       Office Visit on 10/03/2018   Component Date Value Ref Range Status    Hemoglobin A1C 10/03/2018 6 4   Final    Hemoglobin A1C 6 4   Orders Only on 09/04/2018   Component Date Value Ref Range Status    Glucose 09/04/2018 84  65 - 99 mg/dL Final    BUN 09/04/2018 17  8 - 27 mg/dL Final    Creatinine 09/04/2018 1 26  0 76 - 1 27 mg/dL Final    eGFR Non  09/04/2018 61  >59 mL/min/1 73 Final    SL AMB EGFR  09/04/2018 70  >59 mL/min/1 73 Final    SL AMB BUN/CREATININE RATIO 09/04/2018 13  10 - 24 Final    Sodium 09/04/2018 140  134 - 144 mmol/L Final    SL AMB POTASSIUM 09/04/2018 3 8  3 5 - 5 2 mmol/L Final    Chloride 09/04/2018 98  96 - 106 mmol/L Final    CO2 09/04/2018 23  20 - 29 mmol/L Final    CALCIUM 09/04/2018 9 9  8 6 - 10 2 mg/dL Final    SL AMB PROTEIN, TOTAL 09/04/2018 6 9  6 0 - 8 5 g/dL Final    Albumin 09/04/2018 4 4  3 6 - 4 8 g/dL Final    Globulin, Total 09/04/2018 2 5  1 5 - 4 5 g/dL Final    SL AMB ALBUMIN/GLOBULIN RATIO 09/04/2018 1 8  1 2 - 2 2 Final    TOTAL BILIRUBIN 09/04/2018 0 5  0 0 - 1 2 mg/dL Final    Alk Phos Isoenzymes 09/04/2018 39  39 - 117 IU/L Final    SL AMB AST 09/04/2018 21  0 - 40 IU/L Final    SL AMB ALT 09/04/2018 35  0 - 44 IU/L Final    Cholesterol, Total 09/04/2018 144  100 - 199 mg/dL Final    Triglycerides 09/04/2018 133  0 - 149 mg/dL Final    HDL 09/04/2018 39* >39 mg/dL Final    SL AMB VLDL CHOLESTEROL CALC 09/04/2018 27  5 - 40 mg/dL Final    LDL Direct 09/04/2018 78  0 - 99 mg/dL Final    TESTOSTERONE TOTAL 09/04/2018 405  264 - 916 ng/dL Final    Comment: Adult male reference interval is based on a population of  healthy nonobese males (BMI <30) between 23and 44years old  [address] [place] [address], [address] [place] [address] [phone]  PMID: [id number]        Testosterone, Free 09/04/2018 7 2  6 6 - 18 1 pg/mL Final    Prolactin 09/04/2018 21 0* 4 0 - 15 2 ng/mL Final    Prostate Specific Antigen Total 09/04/2018 4 5* 0 0 - 4 0 ng/mL Final    Comment: Roche ECLIA methodology  According to the American Urological Association, Serum PSA should  decrease and remain at undetectable levels after radical  prostatectomy  The AUA defines biochemical recurrence as an initial  PSA value 0 2 ng/mL or greater followed by a subsequent confirmatory  PSA value 0 2 ng/mL or greater  Values obtained with different assay methods or kits cannot be used  interchangeably  Results cannot be interpreted as absolute evidence  of the presence or absence of malignant disease   JACQUIE KEYES DEFAULT 09/04/2018 Comment   Final    Comment: A hand-written panel/profile was received from your office  In  accordance with the [facility] Ambiguous Test Code Policy dated July 0832, we have completed your order by using the closest currently  or formerly recognized AMA panel  We have assigned Comprehensive  Metabolic Panel (14), Test Code #161185 to this request   If this  is not the testing you wished to receive on this specimen, please  contact the [address] [place] [address] "[employer] Inquiry/Technical Services Department  to clarify the test order  We appreciate your business   MILTONNEGAR CUEVASANEL DEFAULT 09/04/2018 Comment   Final    Comment: A hand-written panel/profile was received from your office  In  accordance with the [employer] Ambiguous Test Code Policy dated July 7875, we have completed your order by using the closest currently  or formerly recognized AMA panel  We have assigned Lipid Panel,  Test Code #277953 to this request  If this is not the testing you  wished to receive on this specimen, please contact the [address] [place] [address]  "[employer] Inquiry/Technical Services Department to clarify the test  order  We appreciate your business  Iris Rivero MD    Note: Portions of the record may have been created with voice recognition software    Occasional wrong word or "sound a like" substitutions may have occurred due to the inherent limitations of voice recognition software  Read the chart carefully and recognize, using context, where substitutions have occurred

## 2018-10-03 NOTE — TELEPHONE ENCOUNTER
Patient is scheduled for MRI on 10/29/18 and he will have to repeat bloodwork  Can you order LABS for him and call when scripts are ready for       Kt Saez [phone]

## 2018-10-04 LAB
LEFT EYE DIABETIC RETINOPATHY: NORMAL
RIGHT EYE DIABETIC RETINOPATHY: NORMAL

## 2018-10-17 LAB
BUN SERPL-MCNC: 15 MG/DL (ref 8–27)
BUN/CREAT SERPL: 13 (ref 10–24)
CALCIUM SERPL-MCNC: 9.9 MG/DL (ref 8.6–10.2)
CHLORIDE SERPL-SCNC: 96 MMOL/L (ref 96–106)
CO2 SERPL-SCNC: 25 MMOL/L (ref 20–29)
CREAT SERPL-MCNC: 1.16 MG/DL (ref 0.76–1.27)
GLUCOSE SERPL-MCNC: 87 MG/DL (ref 65–99)
LABCORP COMMENT: NORMAL
POTASSIUM SERPL-SCNC: 4 MMOL/L (ref 3.5–5.2)
SL AMB EGFR AFRICAN AMERICAN: 78 ML/MIN/1.73
SL AMB EGFR NON AFRICAN AMERICAN: 67 ML/MIN/1.73
SODIUM SERPL-SCNC: 138 MMOL/L (ref 134–144)

## 2018-10-29 ENCOUNTER — TELEPHONE (OUTPATIENT)
Dept: FAMILY MEDICINE CLINIC | Facility: CLINIC | Age: 63
End: 2018-10-29

## 2018-10-29 ENCOUNTER — HOSPITAL ENCOUNTER (OUTPATIENT)
Dept: MRI IMAGING | Facility: HOSPITAL | Age: 63
Discharge: HOME | End: 2018-10-29
Payer: COMMERCIAL

## 2018-10-29 DIAGNOSIS — E22.9 PITUITARY MICROADENOMA WITH HYPERPROLACTINEMIA (HCC): ICD-10-CM

## 2018-10-29 DIAGNOSIS — D35.2 PITUITARY MICROADENOMA WITH HYPERPROLACTINEMIA (HCC): ICD-10-CM

## 2018-10-29 PROCEDURE — 70553 MRI BRAIN STEM W/O & W/DYE: CPT

## 2018-10-29 PROCEDURE — A9585 GADOBUTROL INJECTION: HCPCS | Performed by: FAMILY MEDICINE

## 2018-10-29 RX ADMIN — GADOBUTROL 8 ML: 604.72 INJECTION INTRAVENOUS at 08:45

## 2018-10-29 NOTE — TELEPHONE ENCOUNTER
Please call Edris Bernheim and let him know that I reviewed his recent test which showed: MRI showed a small 2mm pituitary microadenoma  The radiologist said it didn't look much different from back in May 2017  I recommend that he f/u with his Endocrinologist as we had discussed about whether he should restart medications or not  Thank you! Procedure: Mri Brain W Wo Contrast    Result Date: 10/29/2018  Narrative: MRI BRAIN WITH AND WITHOUT CONTRAST INDICATION: D35 2: Benign neoplasm of pituitary gland E22 9: Hyperfunction of pituitary gland, unspecified  History of elevated prolactin, follow-up of pituitary microadenoma  COMPARISON:  MRI brain study from May 26, 2017  TECHNIQUE: Sagittal T1, axial T2, axial FLAIR, axial T1, axial [place], axial diffusion  Sagittal, axial and coronal T1 postcontrast   Axial BRAVO post contrast   IV Contrast:  8 mL of gadobutrol injection (MULTI-DOSE)  IMAGE QUALITY:   Diagnostic  FINDINGS: BRAIN PARENCHYMA:  There is no discrete mass, mass effect or midline shift  No abnormal white matter signal identified  Brainstem and cerebellum demonstrate normal signal  There is no intracranial hemorrhage  There is no evidence of acute infarction and  diffusion imaging is unremarkable  Postcontrast imaging of the brain demonstrates no abnormal enhancement  VENTRICLES:  Normal  SELLA AND PITUITARY GLAND:  The sella remains normal in contour with a concave superior margin  The pituitary stalk is midline  There is no parasellar soft tissue abnormality  On dynamic imaging, there is a 2 mm central region of differential enhancement which was mentioned on multiple prior studies and thought to represent a microadenoma  This 2 mm focus is seen on image 8 of series 13 it is questionable whether this represents a microadenoma as it is not clearly defined on the dynamic series  There is no other differential focus origin abnormal prominence of the pituitary tissue   ORBITS:  Normal  PARANASAL SINUSES: Small retention cysts are seen within the maxillary sinuses  There is mucosal thickening and a small meniscus identified in the left sphenoid sinus  VASCULATURE:  Evaluation of the major intracranial vasculature demonstrates appropriate flow voids  CALVARIUM AND SKULL BASE:  Normal  EXTRACRANIAL SOFT TISSUES:  Normal      Impression: 1  Questionable 2 mm pituitary microadenoma  This has been reported on multiple prior studies and does not demonstrate significant interval change  2   Inflammatory paranasal sinus disease   Workstation performed: [id number]

## 2018-11-05 ENCOUNTER — TELEPHONE (OUTPATIENT)
Dept: FAMILY MEDICINE CLINIC | Facility: CLINIC | Age: 63
End: 2018-11-05

## 2018-11-05 DIAGNOSIS — E78.2 MIXED HYPERLIPIDEMIA: ICD-10-CM

## 2018-11-05 RX ORDER — FENOFIBRATE 134 MG/1
134 CAPSULE ORAL DAILY
Qty: 90 CAPSULE | Refills: 2 | Status: SHIPPED | OUTPATIENT
Start: 2018-11-05 | End: 2019-09-15 | Stop reason: SDUPTHER

## 2018-11-05 NOTE — TELEPHONE ENCOUNTER
Patient is requesting a change to requested medication from 30 day to 90 day on  FENOFIBRATE MICRONIZED 134 mg  1 pill daily #90  If you agree with this request please send updfated rx to [facility]

## 2018-11-19 DIAGNOSIS — I10 ESSENTIAL HYPERTENSION: Primary | ICD-10-CM

## 2018-11-20 NOTE — TELEPHONE ENCOUNTER
Please call the patient to see if he is actually still taking this medication or if it was changed, thanks

## 2018-11-27 PROCEDURE — 4010F ACE/ARB THERAPY RXD/TAKEN: CPT | Performed by: FAMILY MEDICINE

## 2018-11-27 RX ORDER — TELMISARTAN 80 MG/1
TABLET ORAL
Qty: 90 TABLET | Refills: 3 | Status: SHIPPED | OUTPATIENT
Start: 2018-11-27 | End: 2019-11-12 | Stop reason: SDUPTHER

## 2019-03-28 ENCOUNTER — CLINICAL SUPPORT (OUTPATIENT)
Dept: FAMILY MEDICINE CLINIC | Facility: CLINIC | Age: 64
End: 2019-03-28
Payer: COMMERCIAL

## 2019-03-28 DIAGNOSIS — Z23 NEED FOR ZOSTER VACCINATION: Primary | ICD-10-CM

## 2019-03-28 PROCEDURE — 1111F DSCHRG MED/CURRENT MED MERGE: CPT

## 2019-03-28 PROCEDURE — 90750 HZV VACC RECOMBINANT IM: CPT

## 2019-03-28 PROCEDURE — 90471 IMMUNIZATION ADMIN: CPT

## 2019-04-24 ENCOUNTER — TRANSCRIBE ORDERS (OUTPATIENT)
Dept: ADMINISTRATIVE | Facility: HOSPITAL | Age: 64
End: 2019-04-24

## 2019-04-24 DIAGNOSIS — M75.42 IMPINGEMENT SYNDROME OF LEFT SHOULDER: Primary | ICD-10-CM

## 2019-04-25 ENCOUNTER — TELEPHONE (OUTPATIENT)
Dept: FAMILY MEDICINE CLINIC | Facility: CLINIC | Age: 64
End: 2019-04-25

## 2019-04-25 DIAGNOSIS — E55.9 VITAMIN D DEFICIENCY: Primary | ICD-10-CM

## 2019-04-25 RX ORDER — ERGOCALCIFEROL 1.25 MG/1
50000 CAPSULE ORAL WEEKLY
Qty: 8 CAPSULE | Refills: 0 | Status: SHIPPED | OUTPATIENT
Start: 2019-04-25 | End: 2019-11-11 | Stop reason: ALTCHOICE

## 2019-05-13 ENCOUNTER — HOSPITAL ENCOUNTER (OUTPATIENT)
Dept: MRI IMAGING | Facility: HOSPITAL | Age: 64
Discharge: HOME | End: 2019-05-13
Payer: COMMERCIAL

## 2019-05-13 DIAGNOSIS — M75.42 IMPINGEMENT SYNDROME OF LEFT SHOULDER: ICD-10-CM

## 2019-05-13 PROCEDURE — 73221 MRI JOINT UPR EXTREM W/O DYE: CPT

## 2019-07-02 ENCOUNTER — CLINICAL SUPPORT (OUTPATIENT)
Dept: FAMILY MEDICINE CLINIC | Facility: CLINIC | Age: 64
End: 2019-07-02
Payer: COMMERCIAL

## 2019-07-02 DIAGNOSIS — Z23 NEED FOR ZOSTER VACCINATION: Primary | ICD-10-CM

## 2019-07-02 PROCEDURE — 90471 IMMUNIZATION ADMIN: CPT

## 2019-07-02 PROCEDURE — 90750 HZV VACC RECOMBINANT IM: CPT

## 2019-08-26 LAB
CREAT ?TM UR-SCNC: 82.5 UMOL/L
EXT PROTEIN URINE: 10.1
PROT/CREAT UR: 0.12 MG/G{CREAT}

## 2019-09-15 DIAGNOSIS — E78.2 MIXED HYPERLIPIDEMIA: ICD-10-CM

## 2019-09-15 RX ORDER — FENOFIBRATE 134 MG/1
134 CAPSULE ORAL DAILY
Qty: 90 CAPSULE | Refills: 0 | Status: SHIPPED | OUTPATIENT
Start: 2019-09-15 | End: 2019-11-11

## 2019-10-08 ENCOUNTER — TELEPHONE (OUTPATIENT)
Dept: FAMILY MEDICINE CLINIC | Facility: CLINIC | Age: 64
End: 2019-10-08

## 2019-10-08 DIAGNOSIS — E78.5 DYSLIPIDEMIA: Primary | ICD-10-CM

## 2019-10-08 NOTE — TELEPHONE ENCOUNTER
It looks like he had labs done for his Endocrinologist earlier this year, as long as he is seeing them yearly, the only routine lab he needs added is a fasting lipid panel, which I placed an order for

## 2019-10-08 NOTE — TELEPHONE ENCOUNTER
Patient called asking if you would like him to have blood work done prior to apt in November  Please advise  Patient would like a return call when orders are put through

## 2019-10-21 LAB
LEFT EYE DIABETIC RETINOPATHY: NORMAL
RIGHT EYE DIABETIC RETINOPATHY: NORMAL

## 2019-11-11 ENCOUNTER — OFFICE VISIT (OUTPATIENT)
Dept: FAMILY MEDICINE CLINIC | Facility: CLINIC | Age: 64
End: 2019-11-11
Payer: COMMERCIAL

## 2019-11-11 VITALS
DIASTOLIC BLOOD PRESSURE: 72 MMHG | OXYGEN SATURATION: 94 % | RESPIRATION RATE: 16 BRPM | HEART RATE: 100 BPM | TEMPERATURE: 96.5 F | SYSTOLIC BLOOD PRESSURE: 110 MMHG | WEIGHT: 176 LBS | BODY MASS INDEX: 26.67 KG/M2 | HEIGHT: 68 IN

## 2019-11-11 DIAGNOSIS — E78.5 DYSLIPIDEMIA: ICD-10-CM

## 2019-11-11 DIAGNOSIS — Z23 NEED FOR VACCINATION: ICD-10-CM

## 2019-11-11 DIAGNOSIS — Z00.00 ROUTINE ADULT HEALTH MAINTENANCE: Primary | ICD-10-CM

## 2019-11-11 DIAGNOSIS — I10 BENIGN ESSENTIAL HYPERTENSION: ICD-10-CM

## 2019-11-11 DIAGNOSIS — Z11.4 SCREENING FOR HIV (HUMAN IMMUNODEFICIENCY VIRUS): ICD-10-CM

## 2019-11-11 PROCEDURE — 99396 PREV VISIT EST AGE 40-64: CPT | Performed by: FAMILY MEDICINE

## 2019-11-11 PROCEDURE — 90686 IIV4 VACC NO PRSV 0.5 ML IM: CPT

## 2019-11-11 PROCEDURE — 90471 IMMUNIZATION ADMIN: CPT

## 2019-11-11 PROCEDURE — 90472 IMMUNIZATION ADMIN EACH ADD: CPT

## 2019-11-11 PROCEDURE — 90746 HEPB VACCINE 3 DOSE ADULT IM: CPT

## 2019-11-11 RX ORDER — LEVOTHYROXINE SODIUM 0.03 MG/1
25 TABLET ORAL DAILY
Refills: 3 | COMMUNITY
Start: 2019-10-10

## 2019-11-11 RX ORDER — ALLOPURINOL 300 MG/1
300 TABLET ORAL DAILY
Refills: 3 | COMMUNITY
Start: 2019-10-19

## 2019-11-11 RX ORDER — INSULIN ASPART 100 [IU]/ML
24 INJECTION, SOLUTION INTRAVENOUS; SUBCUTANEOUS
Refills: 3 | COMMUNITY
Start: 2019-09-15

## 2019-11-11 RX ORDER — ROSUVASTATIN CALCIUM 20 MG/1
20 TABLET, COATED ORAL DAILY
Qty: 90 TABLET | Refills: 3 | Status: SHIPPED | OUTPATIENT
Start: 2019-11-11 | End: 2020-02-19

## 2019-11-11 RX ORDER — PEN NEEDLE, DIABETIC 32GX 5/32"
NEEDLE, DISPOSABLE MISCELLANEOUS
Refills: 3 | COMMUNITY
Start: 2019-10-19

## 2019-11-11 RX ORDER — TRAMADOL HYDROCHLORIDE 50 MG/1
50 TABLET ORAL EVERY 8 HOURS PRN
Refills: 0 | COMMUNITY
Start: 2019-10-25 | End: 2020-11-13 | Stop reason: ALTCHOICE

## 2019-11-11 RX ORDER — CELECOXIB 200 MG/1
200 CAPSULE ORAL DAILY
Refills: 1 | COMMUNITY
Start: 2019-10-22 | End: 2020-11-13 | Stop reason: ALTCHOICE

## 2019-11-11 RX ORDER — METFORMIN HYDROCHLORIDE 500 MG/1
TABLET, EXTENDED RELEASE ORAL
COMMUNITY
Start: 2019-11-05 | End: 2022-01-28 | Stop reason: ALTCHOICE

## 2019-11-11 RX ORDER — DULAGLUTIDE 0.75 MG/.5ML
INJECTION, SOLUTION SUBCUTANEOUS
Refills: 3 | COMMUNITY
Start: 2019-10-15 | End: 2022-02-02 | Stop reason: ALTCHOICE

## 2019-11-11 NOTE — PROGRESS NOTES
Darnell Donnelly [age] y o  male   DATE: November 11, 2019   Assessment and Plan:  62 y o  male exam      1  Health Maintenance  - Colonoscopy? 10/27/17-[id number]  - PSA Screen: 4 5 in Sept 2018, has established with Urology, due for follow up this month  - Labs: Reviewed from careeverywhere and FLP, HIV screen ordered  - Immunizations: Reviewed  Recommend yearly flu vaccine, received today  PPSV23 UTD, [id number] 73years of age  TDaP UTD, next in 2029  Shingrix UTD  Hep B#1 today, #2 in 1-2 months and #3 in 6 months  2  Discussed the patient's BMI with him  BMI Counseling: Body mass index is 26 53 kg/m²  The BMI is above normal  Nutrition recommendations include reducing portion sizes and moderation in carbohydrate intake  3  Other diagnoses addressed today:   Benign essential hypertension  BP Readings from Last 3 Encounters:   11/11/19 110/72   10/03/18 118/82   10/27/17 135/77     Lab Results   Component Value Date    CREATININE 1 16 10/15/2018     Controlled on current regimen:  Telmisartan 80mg and Chlorthalidone 25mg daily (on for nephrolithiasis by Urology)    Dyslipidemia  Lab Results   Component Value Date    CHOLESTEROL 144 09/04/2018    HDL 39 (L) 09/04/2018    LDLC 78 09/04/2018    LDLCALC 101 (H) 05/23/2017    TRIG 133 09/04/2018     The 10-year ASCVD risk score (Irma Rivera et al , 2013) is: 16 5%    Values used to calculate the score:      Age: 62 years      Sex: Male      Is Non- : No      Diabetic: Yes      Tobacco smoker: No      Systolic Blood Pressure: 910 mmHg      Is BP treated: Yes      HDL Cholesterol: 39 mg/dL      Total Cholesterol: 144 mg/dL    Uncontrolled  Reviewed healthy, low cholesterol diet  Start Rosuvastatin 20mg daily and stop Fenofibrate  Recheck FLP in 6 months    Subjective:    Delano Villalobos is a 62 y o  male and is here for his comprehensive physical exam      No acute complaints   Had [facility] done recently  Urology- 6/5/19, h/o calcium oxalate stone  Nephrology- 9/11/19, adjusting meds  Endo- Dr Dat Yang for T2DM and Hypothyroidism  [facility]    Histories Updated and Reviewed 11/11/2019:  Patient's Medications   New Prescriptions    ROSUVASTATIN (CRESTOR) 20 MG TABLET    Take 1 tablet (20 mg total) by mouth daily   Previous Medications    ALLOPURINOL (ZYLOPRIM) 300 MG TABLET    Take 300 mg by mouth daily As directed    ASPIRIN (ECOTRIN) 325 MG EC TABLET    Take 81 mg by mouth 2 (two) times a day 2 tabs at bedtime     BD PEN NEEDLE DARIUS U/F 32G X 4 MM MISC    USE 2 DAILY    CELECOXIB (CELEBREX) 200 MG CAPSULE    Take 200 mg by mouth daily Take with food    CHLORTHALIDONE 25 MG TABLET    Take 25 mg by mouth daily    CHOLECALCIFEROL (D3-1000) 1000 UNITS CAPSULE    Take 1,000 Units by mouth daily    CHOLESTYRAMINE LIGHT (PREVALITE) 4 GM/DOSE POWDER    Take 4 g by mouth 2 (two) times a day with meals    INSULIN GLARGINE (LANTUS) 100 UNITS/ML SUBCUTANEOUS INJECTION    Inject 20 Units under the skin daily at bedtime      LATANOPROST (XALATAN) 0 005 % OPHTHALMIC SOLUTION    Administer 1 drop to both eyes daily at bedtime    LEVOTHYROXINE 25 MCG TABLET    Take 25 mcg by mouth daily    LIRAGLUTIDE (VICTOZA SC)    Inject 1 8 mg under the skin daily at bedtime    METFORMIN (GLUCOPHAGE-XR) 500 MG 24 HR TABLET        NOVOLOG FLEXPEN 100 UNITS/ML INJECTION PEN    INJECT 8 UNITS AT DINNER  HOLD IF NOT EATING  TAKE 1/2 DOSE IF EATING 1/2 MEAL      PSYLLIUM (METAMUCIL FIBER PO)    Take 1 Units/oz by mouth 1 teaspoon daily    REPAGLINIDE (PRANDIN) 2 MG TABLET    Take 2 mg by mouth 2 (two) times a day before meals      TELMISARTAN (MICARDIS) 80 MG TABLET    TAKE 1 TABLET EVERY DAY    TRAMADOL (ULTRAM) 50 MG TABLET    Take 50 mg by mouth every 8 (eight) hours as needed    TRULICITY 0 51 YT/9 1KQ SOPN    INJECT 0 75 MG SUBCUTANEOUSLY ONCE A WEEK    WHEAT DEXTRIN (BENEFIBER DRINK MIX PO)    Take 1 unit marking on U-100 syringe by mouth 1 teaspoon daily Modified Medications    No medications on file   Discontinued Medications    ERGOCALCIFEROL (VITAMIN D2) 50,000 UNITS    Take 1 capsule (50,000 Units total) by mouth once a week for 8 doses    FENOFIBRATE MICRONIZED (LOFIBRA) 134 MG CAPSULE    TAKE 1 CAPSULE (134 MG TOTAL) BY MOUTH DAILY    METFORMIN HCL ER PO    Take 500 mg by mouth 2 (two) times a day    MULTIPLE VITAMINS-IRON (CHLORELLA PO)    Take by mouth Chlorella 0 2 gram tablet   Ten per day in the morning    NON FORMULARY    Arugula powder 1 teaspoon daily     No Known Allergies  Past Medical History:   Diagnosis Date    CPAP (continuous positive airway pressure) dependence     lost weight doesnt need anymore    Diabetes mellitus ([address] )     Hyperlipidemia     Hypertension     Kidney stone     Sleep apnea     lost weight and doesnt need cpap     Social History     Socioeconomic History    Marital status: /Civil Union     Spouse name: Not on file    Number of children: Not on file    Years of education: Not on file    Highest education level: Not on file   Occupational History    Not on file   Social Needs    Financial resource strain: Not on file    Food insecurity:     Worry: Not on file     Inability: Not on file    Transportation needs:     Medical: Not on file     Non-medical: Not on file   Tobacco Use    Smoking status: Never Smoker    Smokeless tobacco: Never Used   Substance and Sexual Activity    Alcohol use: No    Drug use: No    Sexual activity: Not on file   Lifestyle    Physical activity:     Days per week: Not on file     Minutes per session: Not on file    Stress: Not on file   Relationships    Social connections:     Talks on phone: Not on file     Gets together: Not on file     Attends [employer] service: Not on file     Active member of club or organization: Not on file     Attends meetings of clubs or organizations: Not on file     Relationship status: Not on file    Intimate partner violence:     Fear of current or ex partner: Not on file     Emotionally abused: Not on file     Physically abused: Not on file     Forced sexual activity: Not on file   Other Topics Concern    Not on file   Social History Narrative    Not on file     Immunization History   Administered Date(s) Administered    Hep B, adult 11/11/2019    Influenza, injectable, quadrivalent, preservative free 0 5 mL 11/11/2019    Influenza, recombinant, quadrivalent,injectable, preservative free 10/03/2018    Pneumococcal Polysaccharide PPV23 10/03/2018    Td (adult), adsorbed 05/09/2000    Tdap 07/31/2019    Zoster Vaccine Recombinant 03/28/2019, 07/02/2019       Review of Systems:  Review of Systems  PHQ-9 Depression Screening    PHQ-9:    Frequency of the following problems over the past two weeks:       Little interest or pleasure in doing things:  0 - not at all  Feeling down, depressed, or hopeless:  0 - not at all  PHQ-2 Score:  0         Objective:  /72   Pulse 100   Temp (!) 96 5 °F (35 8 °C)   Resp 16   Ht 5' 8 3" (1 735 m)   Wt 79 8 kg (176 lb)   SpO2 94%   BMI 26 53 kg/m²   Physical Exam   Constitutional: He is oriented to person, place, and time  He appears well-developed and well-nourished  No distress  HENT:   Head: Normocephalic and atraumatic  Mouth/Throat: Oropharynx is clear and moist  No oropharyngeal exudate  Eyes: Pupils are equal, round, and reactive to light  EOM are normal  Right eye exhibits no discharge  Left eye exhibits no discharge  Neck: Normal range of motion  Neck supple  No JVD present  Cardiovascular: Normal rate, regular rhythm and normal heart sounds  Pulses are no weak pulses  No murmur heard  Pulses:       Dorsalis pedis pulses are 2+ on the right side, and 2+ on the left side  Posterior tibial pulses are 2+ on the right side, and 2+ on the left side  Pulmonary/Chest: Effort normal and breath sounds normal  No stridor  No respiratory distress  He has no wheezes     Abdominal: Soft  Bowel sounds are normal  There is no tenderness  There is no rebound and no guarding  Musculoskeletal: Normal range of motion  He exhibits no edema or tenderness  Feet:   Right Foot:   Skin Integrity: Negative for ulcer, skin breakdown, erythema, warmth, callus or dry skin  Left Foot:   Skin Integrity: Negative for ulcer, skin breakdown, erythema, warmth, callus or dry skin  Neurological: He is alert and oriented to person, place, and time  Skin: Skin is warm and dry  He is not diaphoretic  No erythema  Psychiatric: He has a normal mood and affect  His behavior is normal    Vitals reviewed  Patient's shoes and socks removed  Right Foot/Ankle   Right Foot Inspection  Skin Exam: skin normal and skin intact no dry skin, no warmth, no callus, no erythema, no maceration, no abnormal color, no pre-ulcer, no ulcer and no callus                          Toe Exam: ROM and strength within normal limits  Sensory     Proprioception: intact   Monofilament testing: intact  Vascular  Capillary refills: < 3 seconds  The right DP pulse is 2+  The right PT pulse is 2+  Left Foot/Ankle  Left Foot Inspection  Skin Exam: skin normal and skin intactno dry skin, no warmth, no erythema, no maceration, normal color, no pre-ulcer, no ulcer and no callus                         Toe Exam: ROM and strength within normal limits                   Sensory     Proprioception: intact  Monofilament: intact  Vascular  Capillary refills: < 3 seconds  The left DP pulse is 2+  The left PT pulse is 2+  Assign Risk Category:  No deformity present; No loss of protective sensation; No weak pulses       Risk: 0      Patient Care Team:  Stephen Rivera MD as PCP - General (Family Medicine)  Marah Hernandez MD as Evan Rivera MD    Note: Portions of the record may have been created with voice recognition software    Occasional wrong word or "sound a like" substitutions may have occurred due to the inherent limitations of voice recognition software  Read the chart carefully and recognize, using context, where substitutions have occurred

## 2019-11-11 NOTE — ASSESSMENT & PLAN NOTE
BP Readings from Last 3 Encounters:   11/11/19 110/72   10/03/18 118/82   10/27/17 135/77     Lab Results   Component Value Date    CREATININE 1 16 10/15/2018     Controlled on current regimen:  Telmisartan 80mg and Chlorthalidone 25mg daily (on for nephrolithiasis by Urology)

## 2019-11-11 NOTE — ASSESSMENT & PLAN NOTE
Lab Results   Component Value Date    CHOLESTEROL 144 09/04/2018    HDL 39 (L) 09/04/2018    LDLC 78 09/04/2018    LDLCALC 101 (H) 05/23/2017    TRIG 133 09/04/2018     The 10-year ASCVD risk score (Andrew Barnes et al , 2013) is: 16 5%    Values used to calculate the score:      Age: 62 years      Sex: Male      Is Non- : No      Diabetic: Yes      Tobacco smoker: No      Systolic Blood Pressure: 473 mmHg      Is BP treated: Yes      HDL Cholesterol: 39 mg/dL      Total Cholesterol: 144 mg/dL    Uncontrolled  Reviewed healthy, low cholesterol diet  Start Rosuvastatin 20mg daily and stop Fenofibrate  Recheck FLP in 6 months

## 2019-11-12 DIAGNOSIS — I10 ESSENTIAL HYPERTENSION: ICD-10-CM

## 2019-11-12 RX ORDER — TELMISARTAN 80 MG/1
TABLET ORAL
Qty: 90 TABLET | Refills: 3 | Status: SHIPPED | OUTPATIENT
Start: 2019-11-12 | End: 2020-10-28

## 2019-12-10 DIAGNOSIS — E78.2 MIXED HYPERLIPIDEMIA: ICD-10-CM

## 2019-12-10 RX ORDER — FENOFIBRATE 134 MG/1
134 CAPSULE ORAL DAILY
Qty: 90 CAPSULE | Refills: 0 | OUTPATIENT
Start: 2019-12-10

## 2020-02-19 DIAGNOSIS — E78.5 DYSLIPIDEMIA: Primary | ICD-10-CM

## 2020-02-19 RX ORDER — ATORVASTATIN CALCIUM 40 MG/1
40 TABLET, FILM COATED ORAL
Qty: 30 TABLET | Refills: 5 | Status: SHIPPED | OUTPATIENT
Start: 2020-02-19 | End: 2020-08-01

## 2020-02-19 NOTE — TELEPHONE ENCOUNTER
( office visit 11-) Patient called stating he tried the rosuvastatin but it caused him pain  Arms and legs so he went off of it   He stated he went back on the Fenofibrate which he was on for years and is a requesting a refill to be sent to [facility]

## 2020-02-19 NOTE — TELEPHONE ENCOUNTER
[id number] Mayda Renee then I'd have him wait 1-2 weeks and have him try atorvastatin instead and see if he tolerates that   Most people who have a reaction to one statin, dont have it to all statins, so lets try the other options first

## 2020-02-19 NOTE — TELEPHONE ENCOUNTER
I can put him back on the fenofibrate, but with the diabetes, a statin is better not just for his numbers but also his overall cardiac risk  Other than the rosuvastatin, has he tried atorvastatin before? Or simvastatin or pravastatin? I would try all of those before switching back to fenofibrate because that medication doesn't help his overall cardiac health

## 2020-02-20 ENCOUNTER — TELEPHONE (OUTPATIENT)
Dept: ADMINISTRATIVE | Facility: OTHER | Age: 65
End: 2020-02-20

## 2020-02-20 LAB — HBA1C MFR BLD HPLC: 6.2 %

## 2020-02-20 NOTE — TELEPHONE ENCOUNTER
Upon review of the In Basket request we were able to locate, review, and update the patient chart as requested for Hemoglobin A1c  Any additional questions or concerns should be emailed to the Practice Liaisons via [email or web address] email, please do not reply via In Basket      Thank you  Samson Orta MA

## 2020-02-20 NOTE — TELEPHONE ENCOUNTER
----- Message from [facility]  Kevan Gaines MD sent at 2020  2:31 PM EST -----  [address] [place] [address] Street: [phone]  20 2:31 PM    Hello, our patient has had Hemoglobin A1c completed/performed  Please assist in updating the patient chart by pulling the Care Everywhere (CE) document  The date of service is 19  Thank you,  [facility]   Kevan Gaines MD  [facility]

## 2020-08-01 DIAGNOSIS — E78.5 DYSLIPIDEMIA: ICD-10-CM

## 2020-08-01 RX ORDER — ATORVASTATIN CALCIUM 40 MG/1
TABLET, FILM COATED ORAL
Qty: 90 TABLET | Refills: 1 | Status: SHIPPED | OUTPATIENT
Start: 2020-08-01 | End: 2021-01-29 | Stop reason: SDUPTHER

## 2020-10-28 DIAGNOSIS — I10 ESSENTIAL HYPERTENSION: ICD-10-CM

## 2020-10-28 RX ORDER — TELMISARTAN 80 MG/1
TABLET ORAL
Qty: 90 TABLET | Refills: 0 | Status: SHIPPED | OUTPATIENT
Start: 2020-10-28 | End: 2020-11-13 | Stop reason: SDUPTHER

## 2020-11-13 ENCOUNTER — TELEPHONE (OUTPATIENT)
Dept: ADMINISTRATIVE | Facility: OTHER | Age: 65
End: 2020-11-13

## 2020-11-13 ENCOUNTER — OFFICE VISIT (OUTPATIENT)
Dept: FAMILY MEDICINE CLINIC | Facility: CLINIC | Age: 65
End: 2020-11-13
Payer: COMMERCIAL

## 2020-11-13 VITALS
DIASTOLIC BLOOD PRESSURE: 78 MMHG | BODY MASS INDEX: 26.96 KG/M2 | HEIGHT: 69 IN | RESPIRATION RATE: 14 BRPM | TEMPERATURE: 97.4 F | WEIGHT: 182 LBS | HEART RATE: 100 BPM | SYSTOLIC BLOOD PRESSURE: 130 MMHG

## 2020-11-13 DIAGNOSIS — E78.5 DYSLIPIDEMIA: ICD-10-CM

## 2020-11-13 DIAGNOSIS — Z72.820 SLEEP DEPRIVATION: ICD-10-CM

## 2020-11-13 DIAGNOSIS — E11.8 TYPE 2 DIABETES MELLITUS WITH COMPLICATION, WITH LONG-TERM CURRENT USE OF INSULIN (HCC): ICD-10-CM

## 2020-11-13 DIAGNOSIS — N20.0 NEPHROLITHIASIS: ICD-10-CM

## 2020-11-13 DIAGNOSIS — Z00.00 ANNUAL PHYSICAL EXAM: Primary | ICD-10-CM

## 2020-11-13 DIAGNOSIS — I10 BENIGN ESSENTIAL HYPERTENSION: ICD-10-CM

## 2020-11-13 DIAGNOSIS — E03.9 ACQUIRED HYPOTHYROIDISM: ICD-10-CM

## 2020-11-13 DIAGNOSIS — E22.9 PITUITARY MICROADENOMA WITH HYPERPROLACTINEMIA (HCC): ICD-10-CM

## 2020-11-13 DIAGNOSIS — Z79.4 TYPE 2 DIABETES MELLITUS WITH COMPLICATION, WITH LONG-TERM CURRENT USE OF INSULIN (HCC): ICD-10-CM

## 2020-11-13 DIAGNOSIS — R97.20 ELEVATED PSA, LESS THAN 10 NG/ML: ICD-10-CM

## 2020-11-13 DIAGNOSIS — D35.2 PITUITARY MICROADENOMA WITH HYPERPROLACTINEMIA (HCC): ICD-10-CM

## 2020-11-13 PROCEDURE — 4010F ACE/ARB THERAPY RXD/TAKEN: CPT | Performed by: FAMILY MEDICINE

## 2020-11-13 PROCEDURE — 3008F BODY MASS INDEX DOCD: CPT | Performed by: FAMILY MEDICINE

## 2020-11-13 PROCEDURE — 3075F SYST BP GE 130 - 139MM HG: CPT | Performed by: FAMILY MEDICINE

## 2020-11-13 PROCEDURE — 99396 PREV VISIT EST AGE 40-64: CPT | Performed by: FAMILY MEDICINE

## 2020-11-13 PROCEDURE — 3078F DIAST BP <80 MM HG: CPT | Performed by: FAMILY MEDICINE

## 2020-11-13 PROCEDURE — 1036F TOBACCO NON-USER: CPT | Performed by: FAMILY MEDICINE

## 2020-11-13 PROCEDURE — 3725F SCREEN DEPRESSION PERFORMED: CPT | Performed by: FAMILY MEDICINE

## 2020-11-13 RX ORDER — POTASSIUM CITRATE 10 MEQ/1
10 TABLET, EXTENDED RELEASE ORAL 2 TIMES DAILY
COMMUNITY
Start: 2020-11-08

## 2020-11-13 RX ORDER — MODAFINIL 100 MG/1
100 TABLET ORAL DAILY PRN
Qty: 30 TABLET | Refills: 0 | Status: SHIPPED | OUTPATIENT
Start: 2020-11-13 | End: 2020-12-09 | Stop reason: SDUPTHER

## 2020-11-13 RX ORDER — ASPIRIN 81 MG/1
81 TABLET, CHEWABLE ORAL ONCE
COMMUNITY

## 2020-11-13 RX ORDER — TELMISARTAN 80 MG/1
80 TABLET ORAL DAILY
Qty: 90 TABLET | Refills: 2 | Status: SHIPPED | OUTPATIENT
Start: 2020-11-13 | End: 2021-11-16 | Stop reason: SDUPTHER

## 2020-11-17 ENCOUNTER — VBI (OUTPATIENT)
Dept: ADMINISTRATIVE | Facility: OTHER | Age: 65
End: 2020-11-17

## 2020-11-27 ENCOUNTER — VBI (OUTPATIENT)
Dept: ADMINISTRATIVE | Facility: OTHER | Age: 65
End: 2020-11-27

## 2020-12-09 ENCOUNTER — TELEPHONE (OUTPATIENT)
Dept: FAMILY MEDICINE CLINIC | Facility: CLINIC | Age: 65
End: 2020-12-09

## 2020-12-09 DIAGNOSIS — Z72.820 SLEEP DEPRIVATION: ICD-10-CM

## 2020-12-09 RX ORDER — MODAFINIL 100 MG/1
100 TABLET ORAL DAILY PRN
Qty: 90 TABLET | Refills: 0 | Status: SHIPPED | OUTPATIENT
Start: 2020-12-09 | End: 2021-03-10 | Stop reason: SDUPTHER

## 2021-01-27 ENCOUNTER — TELEPHONE (OUTPATIENT)
Dept: FAMILY MEDICINE CLINIC | Facility: CLINIC | Age: 66
End: 2021-01-27

## 2021-01-27 NOTE — TELEPHONE ENCOUNTER
Left message for patient to call office back to let us know if he does need a refill on his atorvastatin   Received fax rx request from [facility]

## 2021-01-29 DIAGNOSIS — E78.5 DYSLIPIDEMIA: ICD-10-CM

## 2021-01-29 RX ORDER — ATORVASTATIN CALCIUM 40 MG/1
40 TABLET, FILM COATED ORAL
Qty: 90 TABLET | Refills: 1 | Status: SHIPPED | OUTPATIENT
Start: 2021-01-29 | End: 2021-07-27

## 2021-03-10 DIAGNOSIS — Z72.820 SLEEP DEPRIVATION: ICD-10-CM

## 2021-03-10 NOTE — TELEPHONE ENCOUNTER
Patient needs to use [facility] for this prescription  He can only get 200 mg when he uses [facility]  Patient requesting that you change prescription to 200 mg, take 1/2 tab  Please call patient when done

## 2021-03-11 RX ORDER — MODAFINIL 200 MG/1
100 TABLET ORAL DAILY PRN
Qty: 45 TABLET | Refills: 0 | Status: SHIPPED | OUTPATIENT
Start: 2021-03-11 | End: 2021-06-03 | Stop reason: CLARIF

## 2021-06-03 DIAGNOSIS — Z72.820 SLEEP DEPRIVATION: Primary | ICD-10-CM

## 2021-06-03 RX ORDER — BLOOD-GLUCOSE METER
KIT MISCELLANEOUS
COMMUNITY
Start: 2021-04-05

## 2021-06-03 RX ORDER — MODAFINIL 200 MG/1
100 TABLET ORAL DAILY
Qty: 45 TABLET | Refills: 1 | Status: SHIPPED | OUTPATIENT
Start: 2021-06-03 | End: 2021-09-02 | Stop reason: SDUPTHER

## 2021-06-03 RX ORDER — DULAGLUTIDE 1.5 MG/.5ML
INJECTION, SOLUTION SUBCUTANEOUS
COMMUNITY
Start: 2021-02-25 | End: 2022-02-02 | Stop reason: ALTCHOICE

## 2021-06-03 RX ORDER — MODAFINIL 100 MG/1
100 TABLET ORAL DAILY
COMMUNITY
End: 2021-06-03 | Stop reason: SDUPTHER

## 2021-06-03 RX ORDER — LANCETS 28 GAUGE
EACH MISCELLANEOUS
COMMUNITY
Start: 2021-04-05

## 2021-06-03 NOTE — TELEPHONE ENCOUNTER
Refill not due until June 9, will date prescription as such  45 tablets for patient to take 1/2 tablet daily  PDMP reviewed an appropriate

## 2021-06-03 NOTE — TELEPHONE ENCOUNTER
Patient called requesting a refill on the Modinafil 100 mg tablet to [facility]     03/11/2021  1  03/11/2021  MODAFINIL 200 MG TABLET  45 0  90  EM [facility]  [phone]  [facility] ([id number])  0   [place] Ins  [place]

## 2021-09-02 DIAGNOSIS — Z72.820 SLEEP DEPRIVATION: ICD-10-CM

## 2021-09-02 RX ORDER — MODAFINIL 200 MG/1
100 TABLET ORAL DAILY
Qty: 90 TABLET | Refills: 0 | Status: SHIPPED | OUTPATIENT
Start: 2021-09-02 | End: 2021-11-16 | Stop reason: SDUPTHER

## 2021-11-16 ENCOUNTER — OFFICE VISIT (OUTPATIENT)
Dept: FAMILY MEDICINE CLINIC | Facility: CLINIC | Age: 66
End: 2021-11-16
Payer: MEDICARE

## 2021-11-16 VITALS
HEIGHT: 69 IN | TEMPERATURE: 97 F | SYSTOLIC BLOOD PRESSURE: 116 MMHG | BODY MASS INDEX: 26.07 KG/M2 | DIASTOLIC BLOOD PRESSURE: 78 MMHG | WEIGHT: 176 LBS | HEART RATE: 86 BPM | OXYGEN SATURATION: 97 %

## 2021-11-16 DIAGNOSIS — R97.20 ELEVATED PSA, LESS THAN 10 NG/ML: ICD-10-CM

## 2021-11-16 DIAGNOSIS — N20.0 NEPHROLITHIASIS: ICD-10-CM

## 2021-11-16 DIAGNOSIS — E78.5 DYSLIPIDEMIA: ICD-10-CM

## 2021-11-16 DIAGNOSIS — E55.9 VITAMIN D DEFICIENCY: ICD-10-CM

## 2021-11-16 DIAGNOSIS — Z79.4 TYPE 2 DIABETES MELLITUS WITH COMPLICATION, WITH LONG-TERM CURRENT USE OF INSULIN (HCC): ICD-10-CM

## 2021-11-16 DIAGNOSIS — Z79.4 TYPE 2 DIABETES MELLITUS WITH HYPERGLYCEMIA, WITH LONG-TERM CURRENT USE OF INSULIN (HCC): ICD-10-CM

## 2021-11-16 DIAGNOSIS — E11.65 TYPE 2 DIABETES MELLITUS WITH HYPERGLYCEMIA, WITH LONG-TERM CURRENT USE OF INSULIN (HCC): ICD-10-CM

## 2021-11-16 DIAGNOSIS — E22.9 PITUITARY MICROADENOMA WITH HYPERPROLACTINEMIA (HCC): ICD-10-CM

## 2021-11-16 DIAGNOSIS — Z23 ENCOUNTER FOR IMMUNIZATION: ICD-10-CM

## 2021-11-16 DIAGNOSIS — I10 BENIGN ESSENTIAL HYPERTENSION: ICD-10-CM

## 2021-11-16 DIAGNOSIS — E78.2 MIXED HYPERLIPIDEMIA: Primary | ICD-10-CM

## 2021-11-16 DIAGNOSIS — Z72.820 SLEEP DEPRIVATION: ICD-10-CM

## 2021-11-16 DIAGNOSIS — D35.2 PITUITARY ADENOMA (HCC): ICD-10-CM

## 2021-11-16 DIAGNOSIS — E11.8 TYPE 2 DIABETES MELLITUS WITH COMPLICATION, WITH LONG-TERM CURRENT USE OF INSULIN (HCC): ICD-10-CM

## 2021-11-16 DIAGNOSIS — D35.2 PITUITARY MICROADENOMA WITH HYPERPROLACTINEMIA (HCC): ICD-10-CM

## 2021-11-16 PROCEDURE — 1123F ACP DISCUSS/DSCN MKR DOCD: CPT

## 2021-11-16 PROCEDURE — 90662 IIV NO PRSV INCREASED AG IM: CPT

## 2021-11-16 PROCEDURE — G0402 INITIAL PREVENTIVE EXAM: HCPCS | Performed by: PHYSICIAN ASSISTANT

## 2021-11-16 PROCEDURE — G0008 ADMIN INFLUENZA VIRUS VAC: HCPCS

## 2021-11-16 RX ORDER — TELMISARTAN 80 MG/1
80 TABLET ORAL DAILY
Qty: 90 TABLET | Refills: 3 | Status: SHIPPED | OUTPATIENT
Start: 2021-11-16 | End: 2021-11-19 | Stop reason: SDUPTHER

## 2021-11-17 RX ORDER — MODAFINIL 200 MG/1
100 TABLET ORAL DAILY
Qty: 90 TABLET | Refills: 0 | Status: SHIPPED | OUTPATIENT
Start: 2021-11-17 | End: 2021-11-23 | Stop reason: SDUPTHER

## 2021-11-23 DIAGNOSIS — Z72.820 SLEEP DEPRIVATION: ICD-10-CM

## 2021-11-23 RX ORDER — MODAFINIL 200 MG/1
200 TABLET ORAL DAILY
Qty: 90 TABLET | Refills: 1 | Status: SHIPPED | OUTPATIENT
Start: 2021-11-29 | End: 2022-03-29

## 2021-12-18 ENCOUNTER — HOSPITAL ENCOUNTER (OUTPATIENT)
Dept: RADIOLOGY | Facility: HOSPITAL | Age: 66
Discharge: HOME | End: 2021-12-18
Payer: COMMERCIAL

## 2021-12-18 DIAGNOSIS — E78.2 MIXED HYPERLIPIDEMIA: ICD-10-CM

## 2021-12-18 PROCEDURE — 75571 CT HRT W/O DYE W/CA TEST: CPT

## 2021-12-18 PROCEDURE — G1004 CDSM NDSC: HCPCS

## 2022-01-28 DIAGNOSIS — I10 BENIGN ESSENTIAL HYPERTENSION: ICD-10-CM

## 2022-01-28 RX ORDER — TELMISARTAN 80 MG/1
80 TABLET ORAL DAILY
Qty: 90 TABLET | Refills: 3 | Status: SHIPPED | OUTPATIENT
Start: 2022-01-28

## 2022-01-28 NOTE — TELEPHONE ENCOUNTER
Evelio Judge,  According to [address] [facility], [address] [place], [place] ([phone]), I require a NEW prescription for Telmisartan 80 mg (90-day)      My Nov 16, 2021 90-day script will be have been used up on Feb 14, 2022      Please send authorization for refill to [facility] (detail above)       Thanks, Ismael Reveles

## 2022-02-02 ENCOUNTER — OFFICE VISIT (OUTPATIENT)
Dept: CARDIOLOGY CLINIC | Facility: CLINIC | Age: 67
End: 2022-02-02
Payer: MEDICARE

## 2022-02-02 VITALS
WEIGHT: 175.2 LBS | HEIGHT: 69 IN | BODY MASS INDEX: 25.95 KG/M2 | OXYGEN SATURATION: 93 % | HEART RATE: 88 BPM | SYSTOLIC BLOOD PRESSURE: 102 MMHG | DIASTOLIC BLOOD PRESSURE: 58 MMHG

## 2022-02-02 DIAGNOSIS — E11.8 TYPE 2 DIABETES MELLITUS WITH COMPLICATION, WITH LONG-TERM CURRENT USE OF INSULIN (HCC): ICD-10-CM

## 2022-02-02 DIAGNOSIS — Z79.4 TYPE 2 DIABETES MELLITUS WITH COMPLICATION, WITH LONG-TERM CURRENT USE OF INSULIN (HCC): ICD-10-CM

## 2022-02-02 DIAGNOSIS — E78.2 MIXED HYPERLIPIDEMIA: ICD-10-CM

## 2022-02-02 DIAGNOSIS — I10 BENIGN ESSENTIAL HYPERTENSION: ICD-10-CM

## 2022-02-02 DIAGNOSIS — R93.1 ABNORMAL FINDINGS ON DIAGNOSTIC IMAGING OF HEART AND CORONARY CIRCULATION: ICD-10-CM

## 2022-02-02 DIAGNOSIS — I25.10 CORONARY ARTERY DISEASE INVOLVING NATIVE CORONARY ARTERY OF NATIVE HEART WITHOUT ANGINA PECTORIS: Primary | ICD-10-CM

## 2022-02-02 PROCEDURE — 99204 OFFICE O/P NEW MOD 45 MIN: CPT | Performed by: INTERNAL MEDICINE

## 2022-02-02 PROCEDURE — 93000 ELECTROCARDIOGRAM COMPLETE: CPT | Performed by: INTERNAL MEDICINE

## 2022-02-02 RX ORDER — DULAGLUTIDE 1.5 MG/.5ML
1.5 INJECTION, SOLUTION SUBCUTANEOUS WEEKLY
COMMUNITY

## 2022-02-02 RX ORDER — FAMOTIDINE 10 MG
10 TABLET ORAL 2 TIMES DAILY
COMMUNITY
End: 2022-03-14

## 2022-02-02 RX ORDER — GLUCAGON 3 MG/1
3 POWDER NASAL
COMMUNITY
Start: 2021-11-22

## 2022-02-02 NOTE — ASSESSMENT & PLAN NOTE
Lab Results   Component Value Date    HGBA1C 6 1 (H) 11/18/2021   Well controlled on current medical therapy

## 2022-02-02 NOTE — LETTER
2022     Holly Greenberg MD  [address] [place] [address]    Patient: Saravanan Simmons   YOB: 1955   Date of Visit: 2022       Dear Dr Flaco Collier: Thank you for referring Debbie Upton to me for evaluation  Below are my notes for this consultation  If you have questions, please do not hesitate to call me  I look forward to following your patient along with you  Sincerely,        Su Saucedo MD        CC: No Recipients  Su Saucedo MD  2022  8:55 AM  Sign when Signing Visit  [facility] [address]  [address]  [address] [address]  Phone#  [phone]  Fax#  [phone]  [facility]             NAME: Saravanan Simmons  AGE: 73 y o  SEX: male   : 1955   MRN: [id number]    DATE: 2022  TIME: 8:52 AM    Assessment and plan:    1  Coronary artery disease involving native coronary artery of native heart without angina pectoris  Assessment & Plan:  Calcium score is  Elevated at 547  This implies there is hardening of the coronary arteries noted with possible plaque buildup  This suggests that there is an elevated future risk of adverse cardiovascular events (such as heart attack)  Cardiac risk can be lowered with lifestyle and risk factor modification (BP, glucose, cholesterol and weight management)  Regular exercise, Mediterranean style plant-based diet and cholesterol lowering with statin drugs has shown proven benefits in lowering risk of heart attack and stroke  Medical therapy to lower risk should include:  Enteric coated aspirin 81mg daily  Statins: Atorvastatin 40 mg at bedtime:  to lower risk of progression of coronary plaque  Beta-blockers: Toprol XL 25mg a day to lower risk of heart attack      Stress test will be planned to further assess for significant blockages in the heart arteries, and to assess blood flow in the area is supplied by the calcified arteries  Orders:  -     Ambulatory referral to Cardiology  -     POCT ECG  -     NM myocardial perfusion spect (stress and/or rest); Future; Expected date: 02/02/2022    2  Benign essential hypertension  Assessment & Plan:  BP Readings from Last 3 Encounters:   11/16/21 116/78   11/13/20 130/78   11/11/19 110/72       Home blood pressure monitoring  Continue current medications  Lifestyle modification including increased physical activity, low-salt diet rich in fruits and vegetables, avoidance of alcohol intake and maintaining healthy weight  3  Type 2 diabetes mellitus with complication, with long-term current use of insulin [facility])  Assessment & Plan:    Lab Results   Component Value Date    HGBA1C 6 1 (H) 11/18/2021   Well controlled on current medical therapy  4  Mixed hyperlipidemia  Assessment & Plan:  Lab Results   Component Value Date    LDLCALC 78 09/04/2018     Continue current statin dose  5  Abnormal findings on diagnostic imaging of heart and coronary circulation   -     NM myocardial perfusion spect (stress and/or rest); Future; Expected date: 02/02/2022         Chief Complaint   Patient presents with   [facility] Patient Visit     establishing cardiac care referred by pcp post CT and family hx, no cardiac complaints        HPI:    Tapan Evans is a 73y o -year-old male who presents to the cardiology clinic for evaluation in view of coronary artery disease manifested by an elevated calcium score of 547  Calcium score images reviewed  Calcium localized to the LAD and the RCA  On evaluation of the CT, area of heavy calcification is in the proximal LAD  RCA is more scattered calcification  Left main and circumflex appear free of calcified plaque  Cardiovascular risk factors include type 2 diabetes, hyperlipidemia and essential hypertension  Also reports a family history of CAD      Currently on appropriate guideline directed medical therapy including aspirin, statins, thiazide diuretics and Trulicity  Also on Lantus insulin for diabetes  Diabetes and hyperlipidemia her very well controlled  Hypertension with excellent control as well  He is a lifelong nonsmoker  No tobacco use  No alcohol use  Reports sedentary lifestyle  His brother was diagnosed to have myocardial infarction recently and underwent bypass surgery  All of his male siblings, are diabetics  Current symptoms:  No exertional angina  No dyspnea  Has not been very active lately  Encouraged to start an exercise program     Heart rate stays usually above 80  Blood pressure is on the lower side  He is on a diuretic  Calcium score images were personally reviewed and shared with the patient  Prognostic implications discussed  We discussed continuation of aspirin and statins  Discussed adding beta-blockers but blood pressure is 878 systolic  So hold off for now  Lack of benefit of revascularization of stable coronary disease in the absence of symptoms discussed  If stress test shows abnormalities, coronary CTA can be offered further risk stratification  Cardiology Problem list:  CAD:  Calcium score 547  Left main 0  , LCX 3,    Diabetes: On insulin  Dyslipidemia    Past history, family history, social history, current medications, vital signs, recent lab and imaging studies and  prior cardiology studies reviewed independently on this visit      BP Readings from Last 4 Encounters:   02/02/22 102/58   11/16/21 116/78   11/13/20 130/78   11/11/19 110/72      Wt Readings from Last 3 Encounters:   02/02/22 79 5 kg (175 lb 3 2 oz)   11/16/21 79 8 kg (176 lb)   11/13/20 82 6 kg (182 lb)       Lab Results   Component Value Date    LDLCALC 78 09/04/2018     Lab Results   Component Value Date    CREATININE 1 16 10/15/2018          ALLERGIES:  Allergies   Allergen Reactions    Pollen Extract Other (See Comments)         Current Outpatient Medications:     allopurinol (ZYLOPRIM) 300 mg tablet, Take 300 mg by mouth daily As directed, Disp: , Rfl: 3    aspirin 81 mg chewable tablet, Chew 81 mg once  , Disp: , Rfl:     atorvastatin (LIPITOR) 40 mg tablet, TAKE 1 TABLET BY MOUTH DAILY AT BEDTIME, Disp: 90 tablet, Rfl: 1    BD PEN NEEDLE DARIUS U/F 32G X 4 MM MISC, USE 2 DAILY, Disp: , Rfl: 3    chlorthalidone 25 mg tablet, Take 25 mg by mouth daily, Disp: , Rfl:     Cholecalciferol (D3-1000) 1000 units capsule, Take 1,000 Units by mouth daily, Disp: , Rfl:     Dulaglutide (Trulicity) 1 5 MM/4 0DF SOPN, Inject 1 5 mg under the skin once a week, Disp: , Rfl:     famotidine (PEPCID) 10 mg tablet, Take 10 mg by mouth 2 (two) times a day, Disp: , Rfl:     FREESTYLE LITE test strip, use 1 TEST STRIP to TEST BLOOD SUGAR four times a day, Disp: , Rfl:     Glucagon (Baqsimi One Pack) 3 MG/DOSE POWD, 3 mg, Disp: , Rfl:     insulin glargine (LANTUS) 100 units/mL subcutaneous injection, Inject 20 Units under the skin daily at bedtime  , Disp: , Rfl:     Lancets (freestyle) lancets, use 1 LANCET to TEST BLOOD SUGAR four times a day, Disp: , Rfl:     latanoprost (XALATAN) 0 005 % ophthalmic solution, Administer 1 drop to both eyes daily at bedtime, Disp: , Rfl:     levothyroxine 25 mcg tablet, Take 25 mcg by mouth daily, Disp: , Rfl: 3    modafinil (PROVIGIL) 200 MG tablet, Take 1 tablet (200 mg total) by mouth daily, Disp: 90 tablet, Rfl: 1    NOVOLOG FLEXPEN 100 units/mL injection pen, Inject 24 Units under the skin daily with dinner Take 24 units at dinner   11 units at breakfast  15 units at lunch , Disp: , Rfl: 3    potassium citrate (UROCIT-K) 10 mEq, Take 10 mEq by mouth 2 (two) times a day, Disp: , Rfl:     Psyllium (METAMUCIL FIBER PO), Take 1 Units/oz by mouth 1 teaspoon daily, Disp: , Rfl:     telmisartan (MICARDIS) 80 MG tablet, Take 1 tablet (80 mg total) by mouth daily, Disp: 90 tablet, Rfl: 3    Wheat Dextrin (BENEFIBER DRINK MIX PO), Take 1 unit marking on U-100 syringe by mouth 1 tamaraoon daily, Disp: , Rfl:       Review of Systems   Constitutional: Negative for fever and unexpected weight change  HENT: Negative for congestion and trouble swallowing  Eyes: Negative for visual disturbance  Respiratory: Negative for chest tightness, shortness of breath and wheezing  Cardiovascular: Negative for chest pain, palpitations and leg swelling  Gastrointestinal: Negative for abdominal pain and blood in stool  Endocrine: Negative for polyuria  Genitourinary: Negative for hematuria  Musculoskeletal: Negative for arthralgias and myalgias  Skin: Negative for rash  Neurological: Negative for dizziness, tremors and weakness  Hematological: Does not bruise/bleed easily  Psychiatric/Behavioral: Negative for sleep disturbance  Past Medical History:   Diagnosis Date    CPAP (continuous positive airway pressure) dependence     lost weight doesnt need anymore    Diabetes mellitus ([address] )     Hyperlipidemia     Hypertension     Kidney stone     Sleep apnea     lost weight and doesnt need cpap       Past Surgical History:   Procedure Laterality Date    APPENDECTOMY      COLONOSCOPY      FRACTURE SURGERY      elbow    HERNIA REPAIR      KIDNEY STONE SURGERY Left     [place] COLONOSCOPY FLX DX W/COLLJ SPEC WHEN PFRMD N/A 10/27/2017    Procedure: COLONOSCOPY;  Surgeon: Martina Mai MD;  Location: [facility]; Service: Colorectal       Family History   Problem Relation Age of Onset   [facility] Breast cancer Mother     Diabetes type II Mother     Colon cancer Father     Diabetes type II Brother        Social History   reports that he has never smoked  He has never used smokeless tobacco  He reports that he does not drink alcohol and does not use drugs         Objective:     Vitals:    02/02/22 0814   BP: 102/58   Pulse: 88   SpO2: 93%     Wt Readings from Last 3 Encounters:   02/02/22 79 5 kg (175 lb 3 2 oz)   11/16/21 79 8 kg (176 lb)   11/13/20 82 6 kg (182 lb)     Pulse Readings from Last 3 Encounters:   02/02/22 88   11/16/21 86   11/13/20 100     BP Readings from Last 3 Encounters:   02/02/22 102/58   11/16/21 116/78   11/13/20 130/78         Physical Exam  Constitutional:       General: He is not in acute distress  HENT:      Head: Normocephalic  Eyes:      Conjunctiva/sclera: Conjunctivae normal    Neck:      Vascular: No carotid bruit  Cardiovascular:      Rate and Rhythm: Normal rate and regular rhythm  Heart sounds: S1 normal and S2 normal  No murmur heard  Pulmonary:      Effort: Pulmonary effort is normal       Breath sounds: Normal breath sounds  Abdominal:      General: Bowel sounds are normal  There is no distension  Musculoskeletal:      Right lower leg: No edema  Left lower leg: No edema  Skin:     General: Skin is warm  Neurological:      General: No focal deficit present  Psychiatric:         Mood and Affect: Mood normal          Pertinent Laboratory/Diagnostic Studies:    Laboratory studies reviewed personally by Trupti Viera MD    BMP:   Lab Results   Component Value Date    SODIUM 138 10/15/2018    K 4 0 10/15/2018    CL 96 10/15/2018    CO2 25 10/15/2018    BUN 15 10/15/2018    CREATININE 1 16 10/15/2018    GLUC 87 10/15/2018    CALCIUM 9 7 05/23/2017     CBC:No results found for: WBC, RBC, HGB, HCT, MCV, MCH, RDW, PLT  Coags:    Lipid Profile:   Lab Results   Component Value Date    CHOL 164 05/23/2017     Lab Results   Component Value Date    HDL 39 (L) 09/04/2018     Lab Results   Component Value Date    LDLCALC 78 09/04/2018     Lab Results   Component Value Date    TRIG 133 09/04/2018      No results found for: [id number], TSH  Lab Results   Component Value Date    ALT 35 09/04/2018    AST 21 09/04/2018         Imaging Studies:   Coronary score images reviewed  Cardiac testing:   EKG reviewed personally: Normal sinus rhythm, low voltage in limb leads otherwise normal EKG      Orders Placed This Encounter   Procedures David Carbajal MD, [facility]    Portions of the record may have been created with voice recognition software  Occasional wrong word or "sound a like" substitutions may have occurred due to the inherent limitations of voice recognition software  Read the chart carefully and recognize, using context, where substitutions have occurred  If you have any questions or concerns about the context, text or information contained within the body of this dictation, please contact myself, the provider, for further clarification

## 2022-02-02 NOTE — ASSESSMENT & PLAN NOTE
Calcium score is  Elevated at 547  This implies there is hardening of the coronary arteries noted with possible plaque buildup  This suggests that there is an elevated future risk of adverse cardiovascular events (such as heart attack)  Cardiac risk can be lowered with lifestyle and risk factor modification (BP, glucose, cholesterol and weight management)  Regular exercise, Mediterranean style plant-based diet and cholesterol lowering with statin drugs has shown proven benefits in lowering risk of heart attack and stroke  Medical therapy to lower risk should include:  Enteric coated aspirin 81mg daily  Statins: Atorvastatin 40 mg at bedtime:  to lower risk of progression of coronary plaque  Beta-blockers: Toprol XL 25mg a day to lower risk of heart attack  Stress test will be planned to further assess for significant blockages in the heart arteries, and to assess blood flow in the area is supplied by the calcified arteries

## 2022-02-02 NOTE — ASSESSMENT & PLAN NOTE
BP Readings from Last 3 Encounters:   11/16/21 116/78   11/13/20 130/78   11/11/19 110/72       Home blood pressure monitoring  Continue current medications  Lifestyle modification including increased physical activity, low-salt diet rich in fruits and vegetables, avoidance of alcohol intake and maintaining healthy weight

## 2022-02-02 NOTE — PATIENT INSTRUCTIONS
Calcium score is  Elevated at 547  This implies there is hardening of the coronary arteries noted with possible plaque buildup  This suggests that there is an elevated future risk of adverse cardiovascular events (such as heart attack)  Cardiac risk can be lowered with lifestyle and risk factor modification (BP, glucose, cholesterol and weight management)  Regular exercise, Mediterranean style plant-based diet and cholesterol lowering with statin drugs has shown proven benefits in lowering risk of heart attack and stroke  Medical therapy to lower risk should include:  Enteric coated aspirin 81mg daily  Statins: Atorvastatin 40 mg at bedtime:  to lower risk of progression of coronary plaque  Stress test will be planned to further assess for significant blockages in the heart arteries, and to assess blood flow in the area is supplied by the calcified arteries

## 2022-02-02 NOTE — PROGRESS NOTES
[facility] [place]  [address] [place] [address] [address]  [address]  [facility] [address]  Phone#  [phone]  Fax#  [phone]  [facility] Cardiology Office Consultation             NAME: Brigida Cardozo  AGE: 73 y o  SEX: male   : 1955   MRN: [id number]    DATE: 2022  TIME: 8:52 AM    Assessment and plan:    1  Coronary artery disease involving native coronary artery of native heart without angina pectoris  Assessment & Plan:  Calcium score is  Elevated at 547  This implies there is hardening of the coronary arteries noted with possible plaque buildup  This suggests that there is an elevated future risk of adverse cardiovascular events (such as heart attack)  Cardiac risk can be lowered with lifestyle and risk factor modification (BP, glucose, cholesterol and weight management)  Regular exercise, Mediterranean style plant-based diet and cholesterol lowering with statin drugs has shown proven benefits in lowering risk of heart attack and stroke  Medical therapy to lower risk should include:  Enteric coated aspirin 81mg daily  Statins: Atorvastatin 40 mg at bedtime:  to lower risk of progression of coronary plaque  Beta-blockers: Toprol XL 25mg a day to lower risk of heart attack  Stress test will be planned to further assess for significant blockages in the heart arteries, and to assess blood flow in the area is supplied by the calcified arteries  Orders:  -     Ambulatory referral to Cardiology  -     POCT ECG  -     NM myocardial perfusion spect (stress and/or rest); Future; Expected date: 2022    2  Benign essential hypertension  Assessment & Plan:  BP Readings from Last 3 Encounters:   21 116/78   20 130/78   19 110/72       Home blood pressure monitoring  Continue current medications    Lifestyle modification including increased physical activity, low-salt diet rich in fruits and vegetables, avoidance of alcohol intake and maintaining healthy weight  3  Type 2 diabetes mellitus with complication, with long-term current use of insulin [facility])  Assessment & Plan:    Lab Results   Component Value Date    HGBA1C 6 1 (H) 11/18/2021   Well controlled on current medical therapy  4  Mixed hyperlipidemia  Assessment & Plan:  Lab Results   Component Value Date    LDLCALC 78 09/04/2018     Continue current statin dose  5  Abnormal findings on diagnostic imaging of heart and coronary circulation   -     NM myocardial perfusion spect (stress and/or rest); Future; Expected date: 02/02/2022         Chief Complaint   Patient presents with   [facility] Patient Visit     establishing cardiac care referred by pcp post CT and family hx, no cardiac complaints        HPI:    Roxie Borges is a 73y o -year-old male who presents to the cardiology clinic for evaluation in view of coronary artery disease manifested by an elevated calcium score of 547  Calcium score images reviewed  Calcium localized to the LAD and the RCA  On evaluation of the CT, area of heavy calcification is in the proximal LAD  RCA is more scattered calcification  Left main and circumflex appear free of calcified plaque  Cardiovascular risk factors include type 2 diabetes, hyperlipidemia and essential hypertension  Also reports a family history of CAD  Currently on appropriate guideline directed medical therapy including aspirin, statins, thiazide diuretics and Trulicity  Also on Lantus insulin for diabetes  Diabetes and hyperlipidemia her very well controlled  Hypertension with excellent control as well  He is a lifelong nonsmoker  No tobacco use  No alcohol use  Reports sedentary lifestyle  His brother was diagnosed to have myocardial infarction recently and underwent bypass surgery  All of his male siblings, are diabetics  Current symptoms:  No exertional angina  No dyspnea  Has not been very active lately    Encouraged to start an exercise program     Heart rate stays usually above 80  Blood pressure is on the lower side  He is on a diuretic  Calcium score images were personally reviewed and shared with the patient  Prognostic implications discussed  We discussed continuation of aspirin and statins  Discussed adding beta-blockers but blood pressure is 103 systolic  So hold off for now  Lack of benefit of revascularization of stable coronary disease in the absence of symptoms discussed  If stress test shows abnormalities, coronary CTA can be offered further risk stratification  Cardiology Problem list:  CAD:  Calcium score 547  Left main 0  , LCX 3,    Diabetes: On insulin  Dyslipidemia    Past history, family history, social history, current medications, vital signs, recent lab and imaging studies and  prior cardiology studies reviewed independently on this visit      BP Readings from Last 4 Encounters:   02/02/22 102/58   11/16/21 116/78   11/13/20 130/78   11/11/19 110/72      Wt Readings from Last 3 Encounters:   02/02/22 79 5 kg (175 lb 3 2 oz)   11/16/21 79 8 kg (176 lb)   11/13/20 82 6 kg (182 lb)       Lab Results   Component Value Date    LDLCALC 78 09/04/2018     Lab Results   Component Value Date    CREATININE 1 16 10/15/2018          ALLERGIES:  Allergies   Allergen Reactions    Pollen Extract Other (See Comments)         Current Outpatient Medications:     allopurinol (ZYLOPRIM) 300 mg tablet, Take 300 mg by mouth daily As directed, Disp: , Rfl: 3    aspirin 81 mg chewable tablet, Chew 81 mg once  , Disp: , Rfl:     atorvastatin (LIPITOR) 40 mg tablet, TAKE 1 TABLET BY MOUTH DAILY AT BEDTIME, Disp: 90 tablet, Rfl: 1    BD PEN NEEDLE DARIUS U/F 32G X 4 MM MISC, USE 2 DAILY, Disp: , Rfl: 3    chlorthalidone 25 mg tablet, Take 25 mg by mouth daily, Disp: , Rfl:     Cholecalciferol (D3-1000) 1000 units capsule, Take 1,000 Units by mouth daily, Disp: , Rfl:     Dulaglutide (Trulicity) 1 5 AO/1 0ET SOPN, Inject 1 5 mg under the skin once a week, Disp: , Rfl:     famotidine (PEPCID) 10 mg tablet, Take 10 mg by mouth 2 (two) times a day, Disp: , Rfl:     FREESTYLE LITE test strip, use 1 TEST STRIP to TEST BLOOD SUGAR four times a day, Disp: , Rfl:     Glucagon (Baqsimi One Pack) 3 MG/DOSE POWD, 3 mg, Disp: , Rfl:     insulin glargine (LANTUS) 100 units/mL subcutaneous injection, Inject 20 Units under the skin daily at bedtime  , Disp: , Rfl:     Lancets (freestyle) lancets, use 1 LANCET to TEST BLOOD SUGAR four times a day, Disp: , Rfl:     latanoprost (XALATAN) 0 005 % ophthalmic solution, Administer 1 drop to both eyes daily at bedtime, Disp: , Rfl:     levothyroxine 25 mcg tablet, Take 25 mcg by mouth daily, Disp: , Rfl: 3    modafinil (PROVIGIL) 200 MG tablet, Take 1 tablet (200 mg total) by mouth daily, Disp: 90 tablet, Rfl: 1    NOVOLOG FLEXPEN 100 units/mL injection pen, Inject 24 Units under the skin daily with dinner Take 24 units at dinner  11 units at breakfast  15 units at lunch , Disp: , Rfl: 3    potassium citrate (UROCIT-K) 10 mEq, Take 10 mEq by mouth 2 (two) times a day, Disp: , Rfl:     Psyllium (METAMUCIL FIBER PO), Take 1 Units/oz by mouth 1 teaspoon daily, Disp: , Rfl:     telmisartan (MICARDIS) 80 MG tablet, Take 1 tablet (80 mg total) by mouth daily, Disp: 90 tablet, Rfl: 3    Wheat Dextrin (BENEFIBER DRINK MIX PO), Take 1 unit marking on U-100 syringe by mouth 1 teaspoon daily, Disp: , Rfl:       Review of Systems   Constitutional: Negative for fever and unexpected weight change  HENT: Negative for congestion and trouble swallowing  Eyes: Negative for visual disturbance  Respiratory: Negative for chest tightness, shortness of breath and wheezing  Cardiovascular: Negative for chest pain, palpitations and leg swelling  Gastrointestinal: Negative for abdominal pain and blood in stool  Endocrine: Negative for polyuria  Genitourinary: Negative for hematuria     Musculoskeletal: Negative for arthralgias and myalgias  Skin: Negative for rash  Neurological: Negative for dizziness, tremors and weakness  Hematological: Does not bruise/bleed easily  Psychiatric/Behavioral: Negative for sleep disturbance  Past Medical History:   Diagnosis Date    CPAP (continuous positive airway pressure) dependence     lost weight doesnt need anymore    Diabetes mellitus ([address] )     Hyperlipidemia     Hypertension     Kidney stone     Sleep apnea     lost weight and doesnt need cpap       Past Surgical History:   Procedure Laterality Date    APPENDECTOMY      COLONOSCOPY      FRACTURE SURGERY      elbow    HERNIA REPAIR      KIDNEY STONE SURGERY Left     [place] COLONOSCOPY FLX DX W/COLLJ SPEC WHEN PFRMD N/A 10/27/2017    Procedure: COLONOSCOPY;  Surgeon: Cristóbal Kyle MD;  Location: [facility]; Service: Colorectal       Family History   Problem Relation Age of Onset   Mak Ravenden Breast cancer Mother     Diabetes type II Mother     Colon cancer Father     Diabetes type II Brother        Social History   reports that he has never smoked  He has never used smokeless tobacco  He reports that he does not drink alcohol and does not use drugs  Objective:     Vitals:    02/02/22 0814   BP: 102/58   Pulse: 88   SpO2: 93%     Wt Readings from Last 3 Encounters:   02/02/22 79 5 kg (175 lb 3 2 oz)   11/16/21 79 8 kg (176 lb)   11/13/20 82 6 kg (182 lb)     Pulse Readings from Last 3 Encounters:   02/02/22 88   11/16/21 86   11/13/20 100     BP Readings from Last 3 Encounters:   02/02/22 102/58   11/16/21 116/78   11/13/20 130/78         Physical Exam  Constitutional:       General: He is not in acute distress  HENT:      Head: Normocephalic  Eyes:      Conjunctiva/sclera: Conjunctivae normal    Neck:      Vascular: No carotid bruit  Cardiovascular:      Rate and Rhythm: Normal rate and regular rhythm  Heart sounds: S1 normal and S2 normal  No murmur heard        Pulmonary:      Effort: Pulmonary effort is normal       Breath sounds: Normal breath sounds  Abdominal:      General: Bowel sounds are normal  There is no distension  Musculoskeletal:      Right lower leg: No edema  Left lower leg: No edema  Skin:     General: Skin is warm  Neurological:      General: No focal deficit present  Psychiatric:         Mood and Affect: Mood normal          Pertinent Laboratory/Diagnostic Studies:    Laboratory studies reviewed personally by Armani Spann MD    BMP:   Lab Results   Component Value Date    SODIUM 138 10/15/2018    K 4 0 10/15/2018    CL 96 10/15/2018    CO2 25 10/15/2018    BUN 15 10/15/2018    CREATININE 1 16 10/15/2018    GLUC 87 10/15/2018    CALCIUM 9 7 05/23/2017     CBC:No results found for: WBC, RBC, HGB, HCT, MCV, MCH, RDW, PLT  Coags:    Lipid Profile:   Lab Results   Component Value Date    CHOL 164 05/23/2017     Lab Results   Component Value Date    HDL 39 (L) 09/04/2018     Lab Results   Component Value Date    LDLCALC 78 09/04/2018     Lab Results   Component Value Date    TRIG 133 09/04/2018      No results found for: [id number], TSH  Lab Results   Component Value Date    ALT 35 09/04/2018    AST 21 09/04/2018         Imaging Studies:   Coronary score images reviewed  Cardiac testing:   EKG reviewed personally: Normal sinus rhythm, low voltage in limb leads otherwise normal EKG  Orders Placed This Encounter   Procedures    POCT ECG           Tolu Munoz MD, [facility]    Portions of the record may have been created with voice recognition software  Occasional wrong word or "sound a like" substitutions may have occurred due to the inherent limitations of voice recognition software  Read the chart carefully and recognize, using context, where substitutions have occurred  If you have any questions or concerns about the context, text or information contained within the body of this dictation, please contact myself, the provider, for further clarification

## 2022-02-07 DIAGNOSIS — E78.5 DYSLIPIDEMIA: ICD-10-CM

## 2022-02-07 RX ORDER — INSULIN GLARGINE 100 [IU]/ML
20 INJECTION, SOLUTION SUBCUTANEOUS DAILY
COMMUNITY
Start: 2022-01-28

## 2022-02-07 RX ORDER — ATORVASTATIN CALCIUM 40 MG/1
40 TABLET, FILM COATED ORAL
Qty: 90 TABLET | Refills: 1 | Status: SHIPPED | OUTPATIENT
Start: 2022-02-07 | End: 2022-08-01 | Stop reason: SDUPTHER

## 2022-02-07 NOTE — TELEPHONE ENCOUNTER
Evelio Calle,  [facility] ([phone]) has just notified me via text that the refill of the subject prescription requires your authorization  Please authorize at your earliest opportunity    Thanks, Ismael Reveles

## 2022-02-27 DIAGNOSIS — Z72.820 SLEEP DEPRIVATION: Primary | ICD-10-CM

## 2022-02-27 RX ORDER — ARMODAFINIL 150 MG/1
150 TABLET ORAL DAILY
Qty: 30 TABLET | Refills: 0 | Status: SHIPPED | OUTPATIENT
Start: 2022-02-28 | End: 2022-03-29

## 2022-03-14 ENCOUNTER — HOSPITAL ENCOUNTER (OUTPATIENT)
Dept: NON INVASIVE DIAGNOSTICS | Facility: CLINIC | Age: 67
Discharge: HOME | End: 2022-03-14
Payer: MEDICARE

## 2022-03-14 DIAGNOSIS — R93.1 ABNORMAL FINDINGS ON DIAGNOSTIC IMAGING OF HEART AND CORONARY CIRCULATION: ICD-10-CM

## 2022-03-14 DIAGNOSIS — I25.10 CORONARY ARTERY DISEASE INVOLVING NATIVE CORONARY ARTERY OF NATIVE HEART WITHOUT ANGINA PECTORIS: ICD-10-CM

## 2022-03-14 LAB
BASELINE ST DEPRESSION: 0 MM
MAX HR PERCENT: 103 %
MAX HR: 131 BPM
NUC STRESS EJECTION FRACTION: 75 %
RATE PRESSURE PRODUCT: NORMAL
SL CV REST NUCLEAR ISOTOPE DOSE: 9.6 MCI
SL CV STRESS NUCLEAR ISOTOPE DOSE: 33 MCI
SL CV STRESS RECOVERY BP: NORMAL MMHG
SL CV STRESS RECOVERY HR: 121 BPM
SL CV STRESS STAGE REACHED: 4
STRESS ANGINA INDEX: 0
STRESS BASELINE BP: NORMAL MMHG
STRESS BASELINE HR: 100 BPM
STRESS DUKE TREADMILL SCORE: 9
STRESS O2 SAT REST: 98 %
STRESS PEAK HR: 160 BPM
STRESS PERCENT HR: 103 %
STRESS POST ESTIMATED WORKLOAD: 10.5 METS
STRESS POST EXERCISE DUR MIN: 9 MIN
STRESS POST EXERCISE DUR SEC: 15 SEC
STRESS POST O2 SAT PEAK: 98 %
STRESS POST PEAK BP: 142 MMHG
STRESS ST DEPRESSION: 0 MM
STRESS TARGET HR: 160 BPM
STRESS/REST PERFUSION RATIO: 0.75

## 2022-03-14 PROCEDURE — A9502 TC99M TETROFOSMIN: HCPCS

## 2022-03-14 PROCEDURE — 78452 HT MUSCLE IMAGE SPECT MULT: CPT | Performed by: INTERNAL MEDICINE

## 2022-03-14 PROCEDURE — 93016 CV STRESS TEST SUPVJ ONLY: CPT | Performed by: INTERNAL MEDICINE

## 2022-03-14 PROCEDURE — 93018 CV STRESS TEST I&R ONLY: CPT | Performed by: INTERNAL MEDICINE

## 2022-03-14 PROCEDURE — 78452 HT MUSCLE IMAGE SPECT MULT: CPT

## 2022-03-14 PROCEDURE — 93017 CV STRESS TEST TRACING ONLY: CPT

## 2022-03-14 PROCEDURE — G1004 CDSM NDSC: HCPCS

## 2022-03-14 NOTE — RESULT ENCOUNTER NOTE
Nuclear Stress test reviewed  This shows normal pumping strength of the heart  There is reasonably good blood flow was seen to all the areas of the heart on the nuclear imaging  EKG shows no significant abnormalities with stress  These results are very reassuring despite the coronary calcium noted on CT  Based on your stress test heart rate and BP, I feel starting toprol XL every evening will be effective to lower the risk of heart attack,   Please call the patient with test results

## 2022-03-16 LAB
CHEST PAIN STATEMENT: NORMAL
MAX DIASTOLIC BP: 80 MMHG
MAX HEART RATE: 160 BPM
MAX PREDICTED HEART RATE: 154 BPM
MAX. SYSTOLIC BP: 142 MMHG
PROTOCOL NAME: NORMAL
REASON FOR TERMINATION: NORMAL
TARGET HR FORMULA: NORMAL
TEST INDICATION: NORMAL
TIME IN EXERCISE PHASE: NORMAL

## 2022-03-29 DIAGNOSIS — Z72.820 SLEEP DEPRIVATION: ICD-10-CM

## 2022-03-29 RX ORDER — ARMODAFINIL 200 MG/1
200 TABLET ORAL DAILY
Qty: 30 TABLET | Refills: 0 | Status: SHIPPED | OUTPATIENT
Start: 2022-03-29 | End: 2022-04-28 | Stop reason: SDUPTHER

## 2022-04-26 ENCOUNTER — PATIENT MESSAGE (OUTPATIENT)
Dept: FAMILY MEDICINE CLINIC | Facility: CLINIC | Age: 67
End: 2022-04-26

## 2022-04-26 DIAGNOSIS — Z72.820 SLEEP DEPRIVATION: ICD-10-CM

## 2022-04-28 RX ORDER — ARMODAFINIL 200 MG/1
200 TABLET ORAL DAILY
Qty: 90 TABLET | Refills: 1 | Status: SHIPPED | OUTPATIENT
Start: 2022-04-28

## 2022-05-02 ENCOUNTER — TELEPHONE (OUTPATIENT)
Dept: FAMILY MEDICINE CLINIC | Facility: CLINIC | Age: 67
End: 2022-05-02

## 2022-05-02 NOTE — TELEPHONE ENCOUNTER
Patient's [facility] pharmacy is out his Armodafinil medication  They will not have it for months  They do have the 50 mg tablet in stock  Patient is requesting a new script be sent to pharmacy for 50 mg #360 tablet  He has been out of medication for 3 days

## 2022-08-01 DIAGNOSIS — E78.5 DYSLIPIDEMIA: ICD-10-CM

## 2022-08-01 RX ORDER — ATORVASTATIN CALCIUM 40 MG/1
40 TABLET, FILM COATED ORAL
Qty: 90 TABLET | Refills: 1 | Status: SHIPPED | OUTPATIENT
Start: 2022-08-01

## 2022-10-27 ENCOUNTER — TELEPHONE (OUTPATIENT)
Dept: FAMILY MEDICINE CLINIC | Facility: CLINIC | Age: 67
End: 2022-10-27

## 2022-10-27 DIAGNOSIS — Z72.820 SLEEP DEPRIVATION: ICD-10-CM

## 2022-10-27 RX ORDER — ARMODAFINIL 50 MG/1
200 TABLET ORAL DAILY
Qty: 120 TABLET | Refills: 0 | Status: SHIPPED | OUTPATIENT
Start: 2022-10-27 | End: 2022-11-26

## 2022-10-27 NOTE — TELEPHONE ENCOUNTER
Pt would like to get a new script for armodafinil but he is using 50mg tablets not the 200mg  Per patient, this  allows him to adjust his dose  Please advise  See patient message dated 5/2/2022      [facility] in [place]

## 2022-10-27 NOTE — TELEPHONE ENCOUNTER
This was from months ago when I was out of the office  per message only changed due to availability of medication, the pharmacy now must have the 200 mg tablets  Patient due for visit in November  Will provide 30 days only of medication, no further refills until appointment  This is a controlled, substance, please remind him that no dose adjustments should be made without notifying doctor

## 2022-11-17 ENCOUNTER — RA CDI HCC (OUTPATIENT)
Dept: OTHER | Facility: HOSPITAL | Age: 67
End: 2022-11-17

## 2022-11-17 NOTE — PROGRESS NOTES
[address]  coding opportunities       Chart reviewed, no opportunity found: CHART REVIEWED, NO OPPORTUNITY FOUND        Patients Insurance     Medicare Insurance: Medicare

## 2022-11-23 NOTE — ASSESSMENT & PLAN NOTE
Follows with endocrinology  Last A1C 6 1  Takes Lantus 20U at bedtime, plus mealtime insulin 15-26U   In addition he takes metformin 500 mg twice daily and Trulicity 1 5 mg weekly

## 2022-11-23 NOTE — ASSESSMENT & PLAN NOTE
Diagnosed with coronary artery calcium score  Follows with cardiology  Continue statin, aspirin, beta blocker

## 2022-11-23 NOTE — ASSESSMENT & PLAN NOTE
BP Readings from Last 3 Encounters:   11/25/22 102/68   02/02/22 102/58   11/16/21 116/78      Controlled, continue Metoprolol 25 mg daily, Telmisartan 80 mg and Chlorthalidone 25 mg daily

## 2022-11-25 ENCOUNTER — OFFICE VISIT (OUTPATIENT)
Dept: FAMILY MEDICINE CLINIC | Facility: CLINIC | Age: 67
End: 2022-11-25

## 2022-11-25 ENCOUNTER — TELEPHONE (OUTPATIENT)
Dept: FAMILY MEDICINE CLINIC | Facility: CLINIC | Age: 67
End: 2022-11-25

## 2022-11-25 VITALS
RESPIRATION RATE: 18 BRPM | SYSTOLIC BLOOD PRESSURE: 102 MMHG | HEART RATE: 69 BPM | BODY MASS INDEX: 27.7 KG/M2 | HEIGHT: 67 IN | DIASTOLIC BLOOD PRESSURE: 68 MMHG | OXYGEN SATURATION: 96 % | TEMPERATURE: 97.9 F | WEIGHT: 176.5 LBS

## 2022-11-25 DIAGNOSIS — Z23 ENCOUNTER FOR VACCINATION: ICD-10-CM

## 2022-11-25 DIAGNOSIS — Z72.820 SLEEP DEPRIVATION: ICD-10-CM

## 2022-11-25 DIAGNOSIS — I10 BENIGN ESSENTIAL HYPERTENSION: ICD-10-CM

## 2022-11-25 DIAGNOSIS — Z00.00 MEDICARE ANNUAL WELLNESS VISIT, SUBSEQUENT: Primary | ICD-10-CM

## 2022-11-25 DIAGNOSIS — N20.0 NEPHROLITHIASIS: ICD-10-CM

## 2022-11-25 DIAGNOSIS — Z79.4 TYPE 2 DIABETES MELLITUS WITH COMPLICATION, WITH LONG-TERM CURRENT USE OF INSULIN (HCC): ICD-10-CM

## 2022-11-25 DIAGNOSIS — D35.2 PITUITARY MICROADENOMA WITH HYPERPROLACTINEMIA (HCC): ICD-10-CM

## 2022-11-25 DIAGNOSIS — I25.10 CORONARY ARTERY DISEASE INVOLVING NATIVE CORONARY ARTERY OF NATIVE HEART WITHOUT ANGINA PECTORIS: ICD-10-CM

## 2022-11-25 DIAGNOSIS — E03.9 ACQUIRED HYPOTHYROIDISM: ICD-10-CM

## 2022-11-25 DIAGNOSIS — Z13.89 ENCOUNTER FOR SCREENING FOR OTHER DISORDER: ICD-10-CM

## 2022-11-25 DIAGNOSIS — E22.9 PITUITARY MICROADENOMA WITH HYPERPROLACTINEMIA (HCC): ICD-10-CM

## 2022-11-25 DIAGNOSIS — R97.20 ELEVATED PSA, LESS THAN 10 NG/ML: ICD-10-CM

## 2022-11-25 DIAGNOSIS — E11.8 TYPE 2 DIABETES MELLITUS WITH COMPLICATION, WITH LONG-TERM CURRENT USE OF INSULIN (HCC): ICD-10-CM

## 2022-11-25 RX ORDER — ARMODAFINIL 50 MG/1
200 TABLET ORAL DAILY
Qty: 120 TABLET | Refills: 5 | Status: SHIPPED | OUTPATIENT
Start: 2022-11-25 | End: 2022-11-25

## 2022-11-25 RX ORDER — ARMODAFINIL 50 MG/1
200 TABLET ORAL DAILY
Qty: 120 TABLET | Refills: 5 | Status: CANCELLED | OUTPATIENT
Start: 2022-11-25 | End: 2022-12-25

## 2022-11-25 RX ORDER — ASPIRIN 325 MG
325 TABLET ORAL DAILY
COMMUNITY

## 2022-11-25 RX ORDER — ARMODAFINIL 50 MG/1
200 TABLET ORAL DAILY
Qty: 360 TABLET | Refills: 1 | Status: SHIPPED | OUTPATIENT
Start: 2022-11-25 | End: 2023-02-23

## 2022-11-25 RX ORDER — ARMODAFINIL 50 MG/1
200 TABLET ORAL DAILY
Qty: 120 TABLET | Refills: 11 | Status: CANCELLED | OUTPATIENT
Start: 2022-11-25 | End: 2022-12-25

## 2022-11-25 RX ORDER — CHOLESTYRAMINE 4 G/9G
POWDER, FOR SUSPENSION ORAL
COMMUNITY
Start: 2022-09-05

## 2022-11-25 RX ORDER — BLOOD-GLUCOSE METER
KIT MISCELLANEOUS
COMMUNITY
Start: 2022-04-06 | End: 2023-04-06

## 2022-11-25 NOTE — PROGRESS NOTES
Assessment and Plan:     Problem List Items Addressed This Visit        Endocrine    Type 2 diabetes mellitus with complication, with long-term current use of insulin ([facility] )     Follows with endocrinology  Last A1C 6 1  Takes Lantus 20U at bedtime, plus mealtime insulin 15-26U   In addition he takes metformin 500 mg twice daily and Trulicity 1 5 mg weekly           Relevant Medications    metFORMIN (GLUCOPHAGE) 500 mg tablet    Pituitary microadenoma with hyperprolactinemia ([facility] )     Follows with endocrine         Acquired hypothyroidism     Follows with endocrine and takes levothyroxine 25 mcg for symptomatic subclinical hypothyroid            Cardiovascular and Mediastinum    Benign essential hypertension     BP Readings from Last 3 Encounters:   11/25/22 102/68   02/02/22 102/58   11/16/21 116/78      Controlled, continue Metoprolol 25 mg daily, Telmisartan 80 mg and Chlorthalidone 25 mg daily         Coronary artery disease involving native coronary artery of native heart without angina pectoris     Diagnosed with coronary artery calcium score  Follows with cardiology  Continue statin, aspirin, beta blocker            Genitourinary    Nephrolithiasis     Follows with nephrology  On allopurinol, potassium citrate, and cholestyramine daily            Other    Elevated PSA, less than 10 ng/ml     Follows with urology          Sleep deprivation     Due to work schedule with clients in [place] and [facility]  Continue armodafinil 200 mg daily (patient takes 4 50 mg tablets due to size)  Discussed need to be seen yearly for refills         Relevant Medications    Armodafinil 50 MG tablet   Other Visit Diagnoses     Medicare annual wellness visit, subsequent    -  Primary    Encounter for vaccination        Relevant Orders    Pneumococcal Conjugate Vaccine 20-valent (Pcv20) (Completed)    influenza vaccine, high-dose, PF 0 7 mL (FLUZONE HIGH-DOSE) (Completed)    Encounter for screening for other disorder Preventive health issues were discussed with patient, and age appropriate screening tests were ordered as noted in patient's After Visit Summary  Personalized health advice and appropriate referrals for health education or preventive services given if needed, as noted in patient's After Visit Summary  History of Present Illness:     Patient presents for a Medicare Wellness Visit    HPI   Patient Care Team:  Andreea Irwin MD as PCP - General (Family Medicine)  Inessa Gomez MD as PCP - PCP-[facility] Terry Dominguez MD as Endoscopist     Review of Systems:     Review of Systems     Problem List:     Patient Active Problem List   Diagnosis   • Benign essential hypertension   • Type 2 diabetes mellitus with complication, with long-term current use of insulin ([address] )   • Mixed hyperlipidemia   • Erectile dysfunction of non-organic origin   • Nephrolithiasis   • Pituitary microadenoma with hyperprolactinemia (H[facility])   • Elevated PSA, less than 10 ng/ml   • Vitamin D deficiency   • BMI 26 0-26 9,adult   • Acquired hypothyroidism   • Sleep deprivation   • Coronary artery disease involving native coronary artery of native heart without angina pectoris      Past Medical and Surgical History:     Past Medical History:   Diagnosis Date   • CPAP (continuous positive airway pressure) dependence     lost weight doesnt need anymore   • Diabetes mellitus ([address] )    • Hyperlipidemia    • Hypertension    • Kidney stone    • Sleep apnea     lost weight and doesnt need cpap     Past Surgical History:   Procedure Laterality Date   • APPENDECTOMY     • COLONOSCOPY     • FRACTURE SURGERY      elbow   • HERNIA REPAIR     • KIDNEY STONE SURGERY Left    • DE COLONOSCOPY FLX DX W/COLLJ SPEC WHEN PFRMD N/A 10/27/2017    Procedure: COLONOSCOPY;  Surgeon: Edmond Nunez MD;  Location: [facility];   Service: Colorectal      Family History:     Family History   Problem Relation Age of Onset   • Breast cancer Mother    • Diabetes type II Mother    • Colon cancer Father    • Diabetes type II Brother       Social History:     Social History     Socioeconomic History   • Marital status: /Civil Union     Spouse name: None   • Number of children: None   • Years of education: None   • Highest education level: None   Occupational History   • None   Tobacco Use   • Smoking status: Never   • Smokeless tobacco: Never   Substance and Sexual Activity   • Alcohol use: No   • Drug use: No   • Sexual activity: Yes     Partners: Female     Birth control/protection: None   Other Topics Concern   • None   Social History Narrative   • None     Social Determinants of Health     Financial Resource Strain: Low Risk    • Difficulty of Paying Living Expenses: Not hard at all   Food Insecurity: Not on file   Transportation Needs: No Transportation Needs   • Lack of Transportation (Medical): No   • Lack of Transportation (Non-Medical): No   Physical Activity: Not on file   Stress: Not on file   Social Connections: Not on file   Intimate Partner Violence: Not on file   Housing Stability: Not on file      Medications and Allergies:     Current Outpatient Medications   Medication Sig Dispense Refill   • allopurinol (ZYLOPRIM) 300 mg tablet Take 300 mg by mouth daily As directed  3   • Armodafinil 50 MG tablet Take 4 tablets (200 mg total) by mouth daily 120 tablet 5   • aspirin 325 mg tablet Take 325 mg by mouth daily     • atorvastatin (LIPITOR) 40 mg tablet Take 1 tablet (40 mg total) by mouth daily at bedtime 90 tablet 1   • BD PEN NEEDLE DARIUS U/F 32G X 4 MM MISC USE 2 DAILY  3   • Blood Glucose Monitoring Suppl (FreeStyle Lite) w/Device KIT Use as instructed     • chlorthalidone 25 mg tablet Take 25 mg by mouth daily     • Cholecalciferol 25 MCG (1000 UT) capsule Take 1,000 Units by mouth daily     • cholestyramine (QUESTRAN) 4 g packet TAKE 1 PACKET BY MOUTH IN THE MORNING AND 1 PACKET IN THE EVENING  TAKE WITH MEALS       • Dulaglutide (Trulicity) 1 5 KS/7 3GE SOPN Inject 1 5 mg under the skin once a week     • FREESTYLE LITE test strip use 1 TEST STRIP to TEST BLOOD SUGAR four times a day     • Glucagon (Baqsimi One Pack) 3 MG/DOSE POWD 3 mg     • Lancets (freestyle) lancets use 1 LANCET to TEST BLOOD SUGAR four times a day     • Lantus SoloStar 100 units/mL injection pen 20 Units daily     • latanoprost (XALATAN) 0 005 % ophthalmic solution Administer 1 drop to both eyes daily at bedtime     • levothyroxine 25 mcg tablet Take 25 mcg by mouth daily  3   • metFORMIN (GLUCOPHAGE) 500 mg tablet Take 500 mg by mouth 2 (two) times a day with meals     • metoprolol succinate (TOPROL-XL) 25 mg 24 hr tablet Take 1 tablet (25 mg total) by mouth daily at bedtime 90 tablet 3   • NOVOLOG FLEXPEN 100 units/mL injection pen Inject 24 Units under the skin daily with dinner Take 26-2 8units at dinner  15 units at breakfast  17 units at lunch  3   • potassium citrate (UROCIT-K) 10 mEq Take 10 mEq by mouth 2 (two) times a day     • Psyllium (METAMUCIL FIBER PO) Take 1 Units/oz by mouth 1 teaspoon daily     • telmisartan (MICARDIS) 80 MG tablet Take 1 tablet (80 mg total) by mouth daily 90 tablet 3   • Wheat Dextrin (BENEFIBER DRINK MIX PO) Take 1 unit marking on U-100 syringe by mouth 1 teaspoon daily     • aspirin 81 mg chewable tablet Chew 81 mg once   (Patient not taking: Reported on 11/25/2022)       No current facility-administered medications for this visit       Allergies   Allergen Reactions   • Pollen Extract Other (See Comments)      Immunizations:     Immunization History   Administered Date(s) Administered   • COVID-19 J&J (Imelda) vaccine 0 5 mL 05/03/2021   • Hep B, adult 11/11/2019   • Influenza, high dose seasonal 0 7 mL 11/16/2021, 11/25/2022   • Influenza, injectable, quadrivalent, preservative free 0 5 mL 11/11/2019, 10/03/2020   • Influenza, recombinant, quadrivalent,injectable, preservative free 10/03/2018   • Pneumococcal Conjugate Vaccine 20-valent (Pcv20), Polysace 11/25/2022   • Pneumococcal Polysaccharide PPV23 10/03/2018   • Td (adult), adsorbed 05/09/2000   • Tdap 07/31/2019   • Zoster Vaccine Recombinant 03/28/2019, 07/02/2019      Health Maintenance:         Topic Date Due   • Colorectal Cancer Screening  10/27/2022   • Hepatitis C Screening  Completed         Topic Date Due   • Hepatitis B Vaccine (2 of 3 - 3-dose series) 12/09/2019   • COVID-19 Vaccine (2 - Booster for Imelda series) 06/28/2021      Medicare Screening Tests and Risk Assessments:     Gemma Tuttle is here for his Subsequent Wellness visit  Last Medicare Wellness visit information reviewed, patient interviewed and updates made to the record today  Health Risk Assessment:   Patient rates overall health as very good  Patient feels that their physical health rating is slightly better  Patient is satisfied with their life  Eyesight was rated as same  Hearing was rated as same  Patient feels that their emotional and mental health rating is slightly better  Patients states they are never, rarely angry  Patient states they are sometimes unusually tired/fatigued  Pain experienced in the last 7 days has been some  Patient's pain rating has been 6/10  Patient states that he has experienced no weight loss or gain in last 6 months  Depression Screening:   PHQ-2 Score: 0      Fall Risk Screening: In the past year, patient has experienced: no history of falling in past year      Home Safety:  Patient does not have trouble with stairs inside or outside of their home  Patient has working smoke alarms and has working carbon monoxide detector  Home safety hazards include: none  Nutrition:   Current diet is Diabetic and Low Carb  Medications:   Patient is currently taking over-the-counter supplements  OTC medications include: Aspirin (325 mg/day), Vitamin D3 (1,000 IU/day), Co-enzyme Q10  Patient is able to manage medications       Activities of Daily Living (ADLs)/Instrumental Activities of Daily Living (IADLs):   Walk and transfer into and out of bed and chair?: Yes  Dress and groom yourself?: Yes    Bathe or shower yourself?: Yes    Feed yourself? Yes  Do your laundry/housekeeping?: Yes  Manage your money, pay your bills and track your expenses?: Yes  Make your own meals?: Yes    Do your own shopping?: Yes    Durable Medical Equipment Suppliers  None    Previous Hospitalizations:   Any hospitalizations or ED visits within the last 12 months?: No      Advance Care Planning:   Living will: Yes    Durable POA for healthcare: Yes    Advanced directive: Yes    Advanced directive counseling given: Yes      Cognitive Screening:   Provider or family/friend/caregiver concerned regarding cognition?: No    PREVENTIVE SCREENINGS      Cardiovascular Screening:    General: Screening Not Indicated and History Lipid Disorder      Diabetes Screening:     General: Screening Not Indicated and History Diabetes      Colorectal Cancer Screening:     General: Screening Current      Abdominal Aortic Aneurysm (AAA) Screening:    Risk factors include: age between 73-69 yo        Lung Cancer Screening:     General: Screening Not Indicated      Hepatitis C Screening:    General: Screening Current    Screening, Brief Intervention, and Referral to Treatment (SBIRT)    Screening  Typical number of drinks in a day: 0  Typical number of drinks in a week: 0  Interpretation: Low risk drinking behavior      AUDIT-C Screenin) How often did you have a drink containing alcohol in the past year? never  2) How many drinks did you have on a typical day when you were drinking in the past year? 0  3) How often did you have 6 or more drinks on one occasion in the past year? never    AUDIT-C Score: 0  Interpretation: Score 0-3 (male): Negative screen for alcohol misuse    Single Item Drug Screening:  How often have you used an illegal drug (including marijuana) or a prescription medication for non-medical reasons in the past year? never    Single Item Drug Screen Score: 0  Interpretation: Negative screen for possible drug use disorder    Brief Intervention  Alcohol & drug use screenings were reviewed  No concerns regarding substance use disorder identified  Review of Current Opioid Use    Opioid Risk Tool (ORT) Interpretation: Complete Opioid Risk Tool (ORT)    Other Counseling Topics:   Car/seat belt/driving safety  No results found  Physical Exam:     /68 (BP Location: Left arm, Patient Position: Sitting, Cuff Size: Standard)   Pulse 69   Temp 97 9 °F (36 6 °C)   Resp 18   Ht 5' 7 25" (1 708 m)   Wt 80 1 kg (176 lb 8 oz)   SpO2 96%   BMI 27 44 kg/m²     Physical Exam  Constitutional:       General: He is not in acute distress  Appearance: Normal appearance  He is not ill-appearing or toxic-appearing  HENT:      Head: Normocephalic and atraumatic  Right Ear: External ear normal       Left Ear: External ear normal       Nose: Nose normal       Mouth/Throat:      Mouth: Mucous membranes are moist    Eyes:      Extraocular Movements: Extraocular movements intact  Conjunctiva/sclera: Conjunctivae normal    Pulmonary:      Effort: Pulmonary effort is normal  No respiratory distress  Musculoskeletal:      Cervical back: Normal range of motion and neck supple  No rigidity  Skin:     Findings: No erythema or rash  Neurological:      General: No focal deficit present  Mental Status: He is alert and oriented to person, place, and time     Psychiatric:         Mood and Affect: Mood normal          Behavior: Behavior normal           Tommy Mendieta MD

## 2022-11-25 NOTE — PATIENT INSTRUCTIONS
Medicare Preventive Visit Patient Instructions  Thank you for completing your Welcome to Medicare Visit or Medicare Annual Wellness Visit today  Your next wellness visit will be due in one year (11/26/2023)  The screening/preventive services that you may require over the next 5-10 years are detailed below  Some tests may not apply to you based off risk factors and/or age  Screening tests ordered at today's visit but not completed yet may show as past due  Also, please note that scanned in results may not display below  Preventive Screenings:  Service Recommendations Previous Testing/Comments   Colorectal Cancer Screening  · Colonoscopy    · Fecal Occult Blood Test (FOBT)/Fecal Immunochemical Test (FIT)  · Fecal DNA/Cologuard Test  · Flexible Sigmoidoscopy Age: 39-70 years old   Colonoscopy: every 10 years (May be performed more frequently if at higher risk)  OR  FOBT/FIT: every 1 year  OR  Cologuard: every 3 years  OR  Sigmoidoscopy: every 5 years  Screening may be recommended earlier than age 39 if at higher risk for colorectal cancer  Also, an individualized decision between you and your healthcare provider will decide whether screening between the ages of 74-80 would be appropriate   Colonoscopy: 10/27/2017  FOBT/FIT: Not on file  Cologuard: Not on file  Sigmoidoscopy: Not on file    Screening Current     Prostate Cancer Screening Individualized decision between patient and health care provider in men between ages of 53-78   Medicare will cover every 12 months beginning on the day after your 50th birthday PSA: 4 5 ng/mL           Hepatitis C Screening Once for adults born between 1945 and 1965  More frequently in patients at high risk for Hepatitis C Hep C Antibody: 06/12/2017    Screening Current   Diabetes Screening 1-2 times per year if you're at risk for diabetes or have pre-diabetes Fasting glucose: No results in last 5 years (No results in last 5 years)  A1C: 6 1 % (7/28/2022)  Screening Not Indicated  History Diabetes   Cholesterol Screening Once every 5 years if you don't have a lipid disorder  May order more often based on risk factors  Lipid panel: 09/04/2018  Screening Not Indicated  History Lipid Disorder      Other Preventive Screenings Covered by Medicare:  1  Abdominal Aortic Aneurysm (AAA) Screening: covered once if your at risk  You're considered to be at risk if you have a family history of AAA or a male between the age of 73-68 who smoking at least 100 cigarettes in your lifetime  2  Lung Cancer Screening: covers low dose CT scan once per year if you meet all of the following conditions: (1) Age 50-69; (2) No signs or symptoms of lung cancer; (3) Current smoker or have quit smoking within the last 15 years; (4) You have a tobacco smoking history of at least 20 pack years (packs per day x number of years you smoked); (5) You get a written order from a healthcare provider  3  Glaucoma Screening: covered annually if you're considered high risk: (1) You have diabetes OR (2) Family history of glaucoma OR (3)  aged 48 and older OR (3)  American aged 72 and older  3  Osteoporosis Screening: covered every 2 years if you meet one of the following conditions: (1) Have a vertebral abnormality; (2) On glucocorticoid therapy for more than 3 months; (3) Have primary hyperparathyroidism; (4) On osteoporosis medications and need to assess response to drug therapy  5  HIV Screening: covered annually if you're between the age of 12-76  Also covered annually if you are younger than 13 and older than 72 with risk factors for HIV infection  For pregnant patients, it is covered up to 3 times per pregnancy      Immunizations:  Immunization Recommendations   Influenza Vaccine Annual influenza vaccination during flu season is recommended for all persons aged >= 6 months who do not have contraindications   Pneumococcal Vaccine   * Pneumococcal conjugate vaccine = PCV13 (Prevnar 13), PCV15 (Vaxneuvance), PCV20 (Prevnar 20)  * Pneumococcal polysaccharide vaccine = PPSV23 (Pneumovax) Adults 2364 years old: 1-3 doses may be recommended based on certain risk factors  Adults 72 years old: 1-2 doses may be recommended based off what pneumonia vaccine you previously received   Hepatitis B Vaccine 3 dose series if at intermediate or high risk (ex: diabetes, end stage renal disease, liver disease)   Tetanus (Td) Vaccine - COST NOT COVERED BY MEDICARE PART B Following completion of primary series, a booster dose should be given every 10 years to maintain immunity against tetanus  Td may also be given as tetanus wound prophylaxis  Tdap Vaccine - COST NOT COVERED BY MEDICARE PART B Recommended at least once for all adults  For pregnant patients, recommended with each pregnancy  Shingles Vaccine (Shingrix) - COST NOT COVERED BY MEDICARE PART B  2 shot series recommended in those aged 48 and above     Health Maintenance Due:      Topic Date Due   • Colorectal Cancer Screening  10/27/2022   • Hepatitis C Screening  Completed     Immunizations Due:      Topic Date Due   • Pneumococcal Vaccine: 65+ Years (2 - PCV) 10/03/2019   • Hepatitis B Vaccine (2 of 3 - 3-dose series) 12/09/2019   • COVID-19 Vaccine (2 - Booster for Imelda series) 06/28/2021   • Influenza Vaccine (1) 09/01/2022     Advance Directives   What are advance directives? Advance directives are legal documents that state your wishes and plans for medical care  These plans are made ahead of time in case you lose your ability to make decisions for yourself  Advance directives can apply to any medical decision, such as the treatments you want, and if you want to donate organs  What are the types of advance directives? There are many types of advance directives, and each state has rules about how to use them  You may choose a combination of any of the following:  · Living will: This is a written record of the treatment you want   You can also choose which treatments you do not want, which to limit, and which to stop at a certain time  This includes surgery, medicine, IV fluid, and tube feedings  · Durable power of  for healthcare [facility]): This is a written record that states who you want to make healthcare choices for you when you are unable to make them for yourself  This person, called a proxy, is usually a family member or a friend  You may choose more than 1 proxy  · Do not resuscitate (DNR) order:  A DNR order is used in case your heart stops beating or you stop breathing  It is a request not to have certain forms of treatment, such as CPR  A DNR order may be included in other types of advance directives  · Medical directive: This covers the care that you want if you are in a coma, near death, or unable to make decisions for yourself  You can list the treatments you want for each condition  Treatment may include pain medicine, surgery, blood transfusions, dialysis, IV or tube feedings, and a ventilator (breathing machine)  · Values history: This document has questions about your views, beliefs, and how you feel and think about life  This information can help others choose the care that you would choose  Why are advance directives important? An advance directive helps you control your care  Although spoken wishes may be used, it is better to have your wishes written down  Spoken wishes can be misunderstood, or not followed  Treatments may be given even if you do not want them  An advance directive may make it easier for your family to make difficult choices about your care  Weight Management   Why it is important to manage your weight:  Being overweight increases your risk of health conditions such as heart disease, high blood pressure, type 2 diabetes, and certain types of cancer  It can also increase your risk for osteoarthritis, sleep apnea, and other respiratory problems  Aim for a slow, steady weight loss   Even a small amount of weight loss can lower your risk of health problems  How to lose weight safely:  A safe and healthy way to lose weight is to eat fewer calories and get regular exercise  You can lose up about 1 pound a week by decreasing the number of calories you eat by 500 calories each day  Healthy meal plan for weight management:  A healthy meal plan includes a variety of foods, contains fewer calories, and helps you stay healthy  A healthy meal plan includes the following:  · Eat whole-grain foods more often  A healthy meal plan should contain fiber  Fiber is the part of grains, fruits, and vegetables that is not broken down by your body  Whole-grain foods are healthy and provide extra fiber in your diet  Some examples of whole-grain foods are whole-wheat breads and pastas, oatmeal, brown rice, and bulgur  · Eat a variety of vegetables every day  Include dark, leafy greens such as spinach, kale, kaylyn greens, and mustard greens  Eat yellow and orange vegetables such as carrots, sweet potatoes, and winter squash  · Eat a variety of fruits every day  Choose fresh or canned fruit (canned in its own juice or light syrup) instead of juice  Fruit juice has very little or no fiber  · Eat low-fat dairy foods  Drink fat-free (skim) milk or 1% milk  Eat fat-free yogurt and low-fat cottage cheese  Try low-fat cheeses such as mozzarella and other reduced-fat cheeses  · Choose meat and other protein foods that are low in fat  Choose beans or other legumes such as split peas or lentils  Choose fish, skinless poultry (chicken or turkey), or lean cuts of red meat (beef or pork)  Before you cook meat or poultry, cut off any visible fat  · Use less fat and oil  Try baking foods instead of frying them  Add less fat, such as margarine, sour cream, regular salad dressing and mayonnaise to foods  Eat fewer high-fat foods  Some examples of high-fat foods include french fries, doughnuts, ice cream, and cakes  · Eat fewer sweets  Limit foods and drinks that are high in sugar  This includes candy, cookies, regular soda, and sweetened drinks  Exercise:  Exercise at least 30 minutes per day on most days of the week  Some examples of exercise include walking, biking, dancing, and swimming  You can also fit in more physical activity by taking the stairs instead of the elevator or parking farther away from stores  Ask your healthcare provider about the best exercise plan for you  Narcotic (Opioid) Safety    Use narcotics safely:  · Take prescribed narcotics exactly as directed  · Do not give narcotics to others or take narcotics that belong to someone else  · Do not mix narcotics without medicines or alcohol  · Do not drive or operate heavy machinery after you take the narcotic  · Monitor for side effects and notify your healthcare provider if you experienced side effects such as nausea, sleepiness, itching, or trouble thinking clearly  Manage constipation:    Constipation is the most common side effect of narcotic medicine  Constipation is when you have hard, dry bowel movements, or you go longer than usual between bowel movements  Tell your healthcare provider about all changes in your bowel movements while you are taking narcotics  He or she may recommend laxative medicine to help you have a bowel movement  He or she may also change the kind of narcotic you are taking, or change when you take it  The following are more ways you can prevent or relieve constipation:    · Drink liquids as directed  You may need to drink extra liquids to help soften and move your bowels  Ask how much liquid to drink each day and which liquids are best for you  · Eat high-fiber foods  This may help decrease constipation by adding bulk to your bowel movements  High-fiber foods include fruits, vegetables, whole-grain breads and cereals, and beans  Your healthcare provider or dietitian can help you create a high-fiber meal plan   Your provider may also recommend a fiber supplement if you cannot get enough fiber from food  · Exercise regularly  Regular physical activity can help stimulate your intestines  Walking is a good exercise to prevent or relieve constipation  Ask which exercises are best for you  · Schedule a time each day to have a bowel movement  This may help train your body to have regular bowel movements  Bend forward while you are on the toilet to help move the bowel movement out  Sit on the toilet for at least 10 minutes, even if you do not have a bowel movement  Store narcotics safely:   · Store narcotics where others cannot easily get them  Keep them in a locked cabinet or secure area  Do not  keep them in a purse or other bag you carry with you  A person may be looking for something else and find the narcotics  · Make sure narcotics are stored out of the reach of children  A child can easily overdose on narcotics  Narcotics may look like candy to a small child  The best way to dispose of narcotics: The laws vary by country and area  In the [place], the best way is to return the narcotics through a take-back program  This program is offered by the [employer]  The following are options for using the program:  · Take the narcotics to a SAMIRA collection site  The site is often a law enforcement center  Call your local law enforcement center for scheduled take-back days in your area  You will be given information on where to go if the collection site is in a different location  · Take the narcotics to an approved pharmacy or hospital   A pharmacy or hospital may be set up as a collection site  You will need to ask if it is a SAMIRA collection site if you were not directed there  A pharmacy or doctor's office may not be able to take back narcotics unless it is a SAMIRA site  · Use a mail-back system  This means you are given containers to put the narcotics into  You will then mail them in the containers    · Use a take-back drop box  This is a place to leave the narcotics at any time  People and animals will not be able to get into the box  Your local law enforcement agency can tell you where to find a drop box in your area  Other ways to manage pain:   · Ask your healthcare provider about non-narcotic medicines to control pain  Nonprescription medicines include NSAIDs (such as ibuprofen) and acetaminophen  Prescription medicines include muscle relaxers, antidepressants, and steroids  · Pain may be managed without any medicines  Some ways to relieve pain include massage, aromatherapy, or meditation  Physical or occupational therapy may also help  For more information:   · Drug Enforcement Administration  [facility] , [address]  Phone: 2- 164 - [phone]  Web Address: [facility]/drug_disposal/    · [address]  and Drug Administration  [facility] , [address] [facility] [address]  Phone: 3- 095 - [phone]  Web Address: http://[employer]/     © Copyright [facility] 2018 Information is for End User's use only and may not be sold, redistributed or otherwise used for commercial purposes   All illustrations and images included in CareNotes® are the copyrighted property of [employer]  or [address] [place] [address] [employer]

## 2022-11-25 NOTE — TELEPHONE ENCOUNTER
Please re-send the Armodafinil 50mg  As a 90 day supply with refills  This way it is less expensive thru [facility] in [place]

## 2022-11-25 NOTE — ASSESSMENT & PLAN NOTE
Due to work schedule with clients in [place] and [facility]  Continue armodafinil 200 mg daily (patient takes 4 50 mg tablets due to size)  Discussed need to be seen yearly for refills

## 2022-12-12 ENCOUNTER — TELEPHONE (OUTPATIENT)
Dept: ADMINISTRATIVE | Facility: OTHER | Age: 67
End: 2022-12-12

## 2022-12-12 NOTE — TELEPHONE ENCOUNTER
Upon review of the In Basket request we were able to locate, review, and update the patient chart as requested for Diabetic Foot Exam     Any additional questions or concerns should be emailed to the Practice Liaisons via the appropriate education email address, please do not reply via In Basket      Thank you  Jerad Moncada

## 2022-12-12 NOTE — TELEPHONE ENCOUNTER
----- Message from Nori Tony sent at 12/9/2022  2:36 PM EST -----  Regarding: foot exam  Requesting documentation for diabetic foot exam that was done at patient endocrinologist Dr Meryl Gaspar at  [address]  Thank you for your help with this matter

## 2023-01-25 ENCOUNTER — ANESTHESIA (OUTPATIENT)
Dept: GASTROENTEROLOGY | Facility: AMBULARY SURGERY CENTER | Age: 68
End: 2023-01-25

## 2023-01-25 ENCOUNTER — ANESTHESIA EVENT (OUTPATIENT)
Dept: GASTROENTEROLOGY | Facility: AMBULARY SURGERY CENTER | Age: 68
End: 2023-01-25

## 2023-01-25 ENCOUNTER — HOSPITAL ENCOUNTER (OUTPATIENT)
Dept: GASTROENTEROLOGY | Facility: AMBULARY SURGERY CENTER | Age: 68
Setting detail: OUTPATIENT SURGERY
Discharge: HOME | End: 2023-01-25
Attending: COLON & RECTAL SURGERY | Admitting: COLON & RECTAL SURGERY

## 2023-01-25 VITALS
RESPIRATION RATE: 21 BRPM | HEIGHT: 69 IN | OXYGEN SATURATION: 98 % | HEART RATE: 77 BPM | DIASTOLIC BLOOD PRESSURE: 61 MMHG | BODY MASS INDEX: 25.48 KG/M2 | WEIGHT: 172 LBS | TEMPERATURE: 97.1 F | SYSTOLIC BLOOD PRESSURE: 99 MMHG

## 2023-01-25 DIAGNOSIS — Z80.0 FAMILY HISTORY OF COLON CANCER IN FATHER: ICD-10-CM

## 2023-01-25 RX ORDER — PROPOFOL 10 MG/ML
INJECTION, EMULSION INTRAVENOUS AS NEEDED
Status: DISCONTINUED | OUTPATIENT
Start: 2023-01-25 | End: 2023-01-25

## 2023-01-25 RX ORDER — SODIUM CHLORIDE, SODIUM LACTATE, POTASSIUM CHLORIDE, CALCIUM CHLORIDE 600; 310; 30; 20 MG/100ML; MG/100ML; MG/100ML; MG/100ML
100 INJECTION, SOLUTION INTRAVENOUS CONTINUOUS
Status: CANCELLED | OUTPATIENT
Start: 2023-01-25

## 2023-01-25 RX ORDER — SODIUM CHLORIDE, SODIUM LACTATE, POTASSIUM CHLORIDE, CALCIUM CHLORIDE 600; 310; 30; 20 MG/100ML; MG/100ML; MG/100ML; MG/100ML
100 INJECTION, SOLUTION INTRAVENOUS CONTINUOUS
Status: DISCONTINUED | OUTPATIENT
Start: 2023-01-25 | End: 2023-01-29 | Stop reason: HOSPADM

## 2023-01-25 RX ORDER — LIDOCAINE HYDROCHLORIDE 10 MG/ML
INJECTION, SOLUTION EPIDURAL; INFILTRATION; INTRACAUDAL; PERINEURAL AS NEEDED
Status: DISCONTINUED | OUTPATIENT
Start: 2023-01-25 | End: 2023-01-25

## 2023-01-25 RX ORDER — SODIUM CHLORIDE, SODIUM LACTATE, POTASSIUM CHLORIDE, CALCIUM CHLORIDE 600; 310; 30; 20 MG/100ML; MG/100ML; MG/100ML; MG/100ML
INJECTION, SOLUTION INTRAVENOUS CONTINUOUS PRN
Status: DISCONTINUED | OUTPATIENT
Start: 2023-01-25 | End: 2023-01-25

## 2023-01-25 RX ADMIN — PROPOFOL 30 MG: 10 INJECTION, EMULSION INTRAVENOUS at 07:38

## 2023-01-25 RX ADMIN — SODIUM CHLORIDE, SODIUM LACTATE, POTASSIUM CHLORIDE, AND CALCIUM CHLORIDE: .6; .31; .03; .02 INJECTION, SOLUTION INTRAVENOUS at 07:15

## 2023-01-25 RX ADMIN — PROPOFOL 30 MG: 10 INJECTION, EMULSION INTRAVENOUS at 07:41

## 2023-01-25 RX ADMIN — PROPOFOL 20 MG: 10 INJECTION, EMULSION INTRAVENOUS at 07:35

## 2023-01-25 RX ADMIN — LIDOCAINE HYDROCHLORIDE 50 MG: 10 INJECTION, SOLUTION EPIDURAL; INFILTRATION; INTRACAUDAL; PERINEURAL at 07:32

## 2023-01-25 RX ADMIN — PROPOFOL 20 MG: 10 INJECTION, EMULSION INTRAVENOUS at 07:44

## 2023-01-25 RX ADMIN — PROPOFOL 150 MG: 10 INJECTION, EMULSION INTRAVENOUS at 07:32

## 2023-01-25 NOTE — H&P
History and Physical   Colon and Rectal Surgery   Ellyn Hood 78 y o  male MRN: [id number]  Unit/Bed#:  Encounter: [id number]  01/25/23   7:21 AM      No chief complaint on file  ASSESSMENT:  Ellyn Hood is a 78 y o  male who presents for colonoscopy  Last was 2017, family history of colon cancer  Screening colonoscopy,discussed in a face-to-face, personal, informed consent process, the benefits, alternatives, risks including not limited to bleeding, missed lesion, perforation requiring emergent surgery discussed/understood  PLAN:  Colonoscopy    History of Present Illness   HPI:  Ellyn Hood is a 78 y o  male who presents for colonoscopy  Denies nausea/vomiting/abdominal pain, change in bowel habits, rectal bleeding, or other constitutional symptoms  Historical Information   Past Medical History:   Diagnosis Date   • [facility]    • Hyperlipidemia    • Hypertension    • Kidney stone      Past Surgical History:   Procedure Laterality Date   • APPENDECTOMY     • COLONOSCOPY     • FRACTURE SURGERY      elbow   • HERNIA REPAIR     • KIDNEY STONE SURGERY Left    • AL COLONOSCOPY FLX DX W/COLLJ SPEC WHEN PFRMD N/A 10/27/2017    Procedure: COLONOSCOPY;  Surgeon: Juanita Hale MD;  Location: [facility];   Service: Colorectal       Meds/Allergies     (Not in a hospital admission)        Current Outpatient Medications:   •  allopurinol (ZYLOPRIM) 300 mg tablet, Take 300 mg by mouth daily As directed, Disp: , Rfl: 3  •  Armodafinil 50 MG tablet, Take 4 tablets (200 mg total) by mouth daily, Disp: 360 tablet, Rfl: 1  •  aspirin 325 mg tablet, Take 325 mg by mouth daily, Disp: , Rfl:   •  atorvastatin (LIPITOR) 40 mg tablet, Take 1 tablet (40 mg total) by mouth daily at bedtime, Disp: 90 tablet, Rfl: 1  •  chlorthalidone 25 mg tablet, Take 25 mg by mouth daily, Disp: , Rfl:   •  Cholecalciferol 25 MCG (1000 UT) capsule, Take 1,000 Units by mouth daily, Disp: , Rfl:   •  cholestyramine (QUESTRAN) 4 g packet, TAKE 1 PACKET BY MOUTH IN THE MORNING AND 1 PACKET IN THE EVENING  TAKE WITH MEALS , Disp: , Rfl:   •  Dulaglutide (Trulicity) 1 5 GA/2 6NT SOPN, Inject 1 5 mg under the skin once a week, Disp: , Rfl:   •  latanoprost (XALATAN) 0 005 % ophthalmic solution, Administer 1 drop to both eyes daily at bedtime, Disp: , Rfl:   •  levothyroxine 25 mcg tablet, Take 25 mcg by mouth daily, Disp: , Rfl: 3  •  metFORMIN (GLUCOPHAGE) 500 mg tablet, Take 500 mg by mouth 2 (two) times a day with meals, Disp: , Rfl:   •  metoprolol succinate (TOPROL-XL) 25 mg 24 hr tablet, Take 1 tablet (25 mg total) by mouth daily at bedtime, Disp: 90 tablet, Rfl: 3  •  potassium citrate (UROCIT-K) 10 mEq, Take 10 mEq by mouth 3 (three) times a day, Disp: , Rfl:   •  Psyllium (METAMUCIL FIBER PO), Take 1 Units/oz by mouth 1 teaspoon daily, Disp: , Rfl:   •  telmisartan (MICARDIS) 80 MG tablet, Take 1 tablet (80 mg total) by mouth daily, Disp: 90 tablet, Rfl: 3  •  Wheat Dextrin (BENEFIBER DRINK MIX PO), Take 1 unit marking on U-100 syringe by mouth 1 teaspoon daily, Disp: , Rfl:   •  BD PEN NEEDLE DARIUS U/F 32G X 4 MM MISC, USE 2 DAILY, Disp: , Rfl: 3  •  Blood Glucose Monitoring Suppl (FreeStyle Lite) w/Device KIT, Use as instructed, Disp: , Rfl:   •  FREESTYLE LITE test strip, use 1 TEST STRIP to TEST BLOOD SUGAR four times a day, Disp: , Rfl:   •  Glucagon (Baqsimi One Pack) 3 MG/DOSE POWD, 3 mg, Disp: , Rfl:   •  Lancets (freestyle) lancets, use 1 LANCET to TEST BLOOD SUGAR four times a day, Disp: , Rfl:   •  Lantus SoloStar 100 units/mL injection pen, 20 Units daily, Disp: , Rfl:   •  NOVOLOG FLEXPEN 100 units/mL injection pen, Inject 24 Units under the skin daily with dinner Take 26-2 8units at dinner   15 units at breakfast  17 units at lunch , Disp: , Rfl: 3    Current Facility-Administered Medications:   •  lactated ringers infusion, 100 mL/hr, Intravenous, Continuous, Cristina Huynh, MD    Allergies   Allergen Reactions   • Pollen Extract Other (See Comments)         Social History   Social History     Substance and Sexual Activity   Alcohol Use No     Social History     Substance and Sexual Activity   Drug Use No     Social History     Tobacco Use   Smoking Status Never   Smokeless Tobacco Never         Family History:   Family History   Problem Relation Age of Onset   • Breast cancer Mother    • Diabetes type II Mother    • Colon cancer Father    • Diabetes type II Brother          Objective     Current Vitals:      No intake or output data in the 24 hours ending 01/25/23 0721    Physical Exam:  General:no distress  Eyes:perrla/eomi  ENT:moist mucus membranes  Neck:supple  Pulm:no increased work of breathing  CV:sinus  Abdomen:soft,nontender  Extremities:no edema

## 2023-01-25 NOTE — ANESTHESIA POSTPROCEDURE EVALUATION
Post-Op Assessment Note    CV Status:  Stable  Pain Score: 0    Pain management: adequate     Mental Status:  Awake and alert   Hydration Status:  Stable   PONV Controlled:  None   Airway Patency:  Patent and adequate      Post Op Vitals Reviewed: Yes      Staff: CRNA, Anesthesiologist         No notable events documented      BP   98/54   Temp     Pulse  79   Resp   16   SpO2   98%

## 2023-01-25 NOTE — ANESTHESIA PREPROCEDURE EVALUATION
Procedure:  COLONOSCOPY    Relevant Problems   ANESTHESIA (within normal limits)      CARDIO   (+) Benign essential hypertension   (+) Coronary artery disease involving native coronary artery of native heart without angina pectoris   (+) Mixed hyperlipidemia      ENDO   (+) Acquired hypothyroidism   (+) Type 2 diabetes mellitus with complication, with long-term current use of insulin (HCC)      /RENAL   (+) Nephrolithiasis        Physical Exam    Airway    Mallampati score: II  TM Distance: >3 FB  Neck ROM: full     Dental       Cardiovascular  Rate: normal,     Pulmonary  Pulmonary exam normal     Other Findings  Per pt denies anything remaining that is loose or removeable      Anesthesia Plan  ASA Score- 2     Anesthesia Type- IV sedation with anesthesia with ASA Monitors  Additional Monitors:   Airway Plan:     Comment: Per patient, appropriately NPO, denies active CP/SOB/wheezing/symptoms related to heartburn/nausea/vomiting  Plan Factors-Exercise tolerance (METS): >4 METS  Chart reviewed  Patient summary reviewed  Patient is not a current smoker  Induction- intravenous  Postoperative Plan-     Informed Consent- Anesthetic plan and risks discussed with patient  I personally reviewed this patient with the CRNA  Discussed and agreed on the Anesthesia Plan with the CRNA  Delvin Lester

## 2023-01-30 DIAGNOSIS — I25.10 CORONARY ARTERY DISEASE INVOLVING NATIVE CORONARY ARTERY OF NATIVE HEART WITHOUT ANGINA PECTORIS: ICD-10-CM

## 2023-01-30 DIAGNOSIS — I10 BENIGN ESSENTIAL HYPERTENSION: ICD-10-CM

## 2023-01-30 LAB
LEFT EYE DIABETIC RETINOPATHY: NORMAL
RIGHT EYE DIABETIC RETINOPATHY: NORMAL

## 2023-01-30 RX ORDER — METOPROLOL SUCCINATE 25 MG/1
TABLET, EXTENDED RELEASE ORAL
Qty: 90 TABLET | Refills: 0 | Status: SHIPPED | OUTPATIENT
Start: 2023-01-30

## 2023-01-30 NOTE — TELEPHONE ENCOUNTER
Patient needs follow up appointment was suppose to see provider in 6 months from last visit it's going on a year

## 2023-02-10 DIAGNOSIS — E78.5 DYSLIPIDEMIA: ICD-10-CM

## 2023-02-10 DIAGNOSIS — I10 BENIGN ESSENTIAL HYPERTENSION: ICD-10-CM

## 2023-02-10 RX ORDER — ATORVASTATIN CALCIUM 40 MG/1
TABLET, FILM COATED ORAL
Qty: 90 TABLET | Refills: 3 | Status: SHIPPED | OUTPATIENT
Start: 2023-02-10

## 2023-02-10 RX ORDER — TELMISARTAN 80 MG/1
TABLET ORAL
Qty: 90 TABLET | Refills: 3 | Status: SHIPPED | OUTPATIENT
Start: 2023-02-10

## 2023-02-21 DIAGNOSIS — Z72.820 SLEEP DEPRIVATION: ICD-10-CM

## 2023-02-21 RX ORDER — ARMODAFINIL 50 MG/1
200 TABLET ORAL DAILY
Qty: 360 TABLET | Refills: 0 | Status: SHIPPED | OUTPATIENT
Start: 2023-02-21 | End: 2023-05-22

## 2023-02-21 NOTE — TELEPHONE ENCOUNTER
1  [id number] 11/28/2022 11/25/2022 Armodafinil (Tablet)  360 0 90 50 MG NA [id number]  Commercial Insurance 0 / 1 [place]    1  [id number] 10/27/2022  10/27/2022 Armodafinil (Tablet)  120 0 30 50 MG NA [id number]  Commercial Insurance 0 / 0 [place]    1  [id number] 07/29/2022 05/02/2022 Armodafinil (Tablet)  360 0 90 50 MG NA [address] [id number]  Commercial Insurance 1 / 1 [place]

## 2023-03-14 ENCOUNTER — OFFICE VISIT (OUTPATIENT)
Dept: CARDIOLOGY CLINIC | Facility: CLINIC | Age: 68
End: 2023-03-14

## 2023-03-14 VITALS
HEIGHT: 69 IN | SYSTOLIC BLOOD PRESSURE: 106 MMHG | BODY MASS INDEX: 24.88 KG/M2 | HEART RATE: 96 BPM | DIASTOLIC BLOOD PRESSURE: 78 MMHG | WEIGHT: 168 LBS

## 2023-03-14 DIAGNOSIS — E78.2 MIXED HYPERLIPIDEMIA: ICD-10-CM

## 2023-03-14 DIAGNOSIS — I10 BENIGN ESSENTIAL HYPERTENSION: ICD-10-CM

## 2023-03-14 DIAGNOSIS — I25.10 CORONARY ARTERY DISEASE INVOLVING NATIVE CORONARY ARTERY OF NATIVE HEART WITHOUT ANGINA PECTORIS: Primary | ICD-10-CM

## 2023-03-14 RX ORDER — INSULIN LISPRO 100 [IU]/ML
INJECTION, SOLUTION INTRAVENOUS; SUBCUTANEOUS
COMMUNITY
Start: 2023-03-13

## 2023-03-14 NOTE — PATIENT INSTRUCTIONS
Continue current cardiac medications  Lipids requested  Continue modification of cardiac risk factors including control of blood glucose, cholesterol, blood pressure and body weight  Report any worsening cardiac symptoms (chest pain,shortness of breath with exertion or fainting)  Keep follow-up as planned

## 2023-03-14 NOTE — PROGRESS NOTES
[facility] [place]  [address] [place] [address] BLVD  [address]  [facility] [address]  Phone#  [phone]  Fax#  [phone]  [facility]'s Cardiology Office Follow-up Visit             NAME: Juanito Baca  AGE: 80 y o  SEX: male   : 1955   MRN: [id number]    DATE: 3/14/2023  TIME: 9:24 AM    Cardiology Problem list:  CAD:  Calcium score 547  Left main 0  , LCX 3,    3/22: SPECT: 9 min, no ischemia, EF over 75  Cardia device:  Sinus rhythm  Diabetes: On insulin  Dyslipidemia    Assessment and plan:    Coronary artery disease involving native coronary artery of native heart without angina pectoris  Calcium score is  elevated at 547  Functional capacity is unchanged  No anginal symptoms  Continue current cardiac medications  Continue cardiac risk factor modification  Monitor for any worsening exertional symptoms  Dyslipidemia  No recent lipids  Lipids requested with next blood draw  HTN  BP Readings from Last 3 Encounters:   23 106/78   23 99/61   22 102/68   Blood pressure very well controlled  Continue current medications  Lifestyle modification measures to help blood pressure control include:increased physical activity, low-salt diet rich in fruits and vegetables, avoidance of alcohol intake and maintaining healthy weight  Chief Complaint   Patient presents with   • Coronary Artery Disease     No cardiac complaints        HPI:    Juanito Baca is a 80y o -year-old male who presents to the cardiology clinic for follow up for the above-listed problems  Patient is doing well from a cardiovascular standpoint  No recent hospitalizations  Current medications reviewed  Reports compliance to medicines  No side effects reported  Denies chest pain on exertion  Denies worsening shortness of breath  Denies sustained palpitations  He exercises and walks on a regular basis    He is taking aspirin, statins and beta-blockers with no side effects  Historically his AST has been elevated but ALT has been normal   He is hoping to see gastroenterology soon regarding this  At the last visit we had reviewed the imaging studies in detail  Today we reviewed his normal exercise nuclear stress test   Prognostic implications of calcium score were discussed  Past history, family history, social history, current medications, vital signs, recent lab and imaging studies and  prior cardiology studies reviewed independently on this visit  Wt Readings from Last 3 Encounters:   03/14/23 76 2 kg (168 lb)   01/25/23 78 kg (172 lb)   11/25/22 80 1 kg (176 lb 8 oz)     Pulse Readings from Last 3 Encounters:   03/14/23 96   01/25/23 77   11/25/22 69     BP Readings from Last 3 Encounters:   03/14/23 106/78   01/25/23 99/61   11/25/22 102/68       Allergies   Allergen Reactions   • Pollen Extract Other (See Comments)       Current Outpatient Medications:   •  allopurinol (ZYLOPRIM) 300 mg tablet, Take 300 mg by mouth daily As directed, Disp: , Rfl: 3  •  Armodafinil 50 MG tablet, Take 4 tablets (200 mg total) by mouth daily, Disp: 360 tablet, Rfl: 0  •  aspirin 325 mg tablet, Take 325 mg by mouth daily, Disp: , Rfl:   •  atorvastatin (LIPITOR) 40 mg tablet, TAKE 1 TABLET BY MOUTH DAILY AT BEDTIME, Disp: 90 tablet, Rfl: 3  •  BD PEN NEEDLE DARIUS U/F 32G X 4 MM MISC, USE 2 DAILY, Disp: , Rfl: 3  •  Blood Glucose Monitoring Suppl (FreeStyle Lite) w/Device KIT, Use as instructed, Disp: , Rfl:   •  chlorthalidone 25 mg tablet, Take 25 mg by mouth 2 (two) times a day, Disp: , Rfl:   •  Cholecalciferol 25 MCG (1000 UT) capsule, Take 1,000 Units by mouth daily, Disp: , Rfl:   •  cholestyramine (QUESTRAN) 4 g packet, TAKE 1 PACKET BY MOUTH IN THE MORNING AND 1 PACKET IN THE EVENING   TAKE WITH MEALS , Disp: , Rfl:   •  Dulaglutide (Trulicity) 1 5 FM/5 1EZ SOPN, Inject 1 5 mg under the skin once a week, Disp: , Rfl:   •  FREESTYLE LITE test strip, use 1 TEST STRIP to TEST BLOOD SUGAR four times a day, Disp: , Rfl:   •  Glucagon (Baqsimi One Pack) 3 MG/DOSE POWD, 3 mg, Disp: , Rfl:   •  HumaLOG KwikPen 100 units/mL injection pen, , Disp: , Rfl:   •  Lancets (freestyle) lancets, use 1 LANCET to TEST BLOOD SUGAR four times a day, Disp: , Rfl:   •  Lantus SoloStar 100 units/mL injection pen, 20 Units daily, Disp: , Rfl:   •  latanoprost (XALATAN) 0 005 % ophthalmic solution, Administer 1 drop to both eyes daily at bedtime, Disp: , Rfl:   •  levothyroxine 25 mcg tablet, Take 25 mcg by mouth daily, Disp: , Rfl: 3  •  metFORMIN (GLUCOPHAGE) 500 mg tablet, Take 500 mg by mouth 2 (two) times a day with meals, Disp: , Rfl:   •  metoprolol succinate (TOPROL-XL) 25 mg 24 hr tablet, TAKE 1 TABLET BY MOUTH AT BEDTIME, Disp: 90 tablet, Rfl: 0  •  potassium citrate (UROCIT-K) 10 mEq, Take 10 mEq by mouth 3 (three) times a day, Disp: , Rfl:   •  Psyllium (METAMUCIL FIBER PO), Take 1 Units/oz by mouth 1 teaspoon daily, Disp: , Rfl:   •  telmisartan (MICARDIS) 80 MG tablet, TAKE 1 TABLET BY MOUTH EVERY DAY, Disp: 90 tablet, Rfl: 3  •  Wheat Dextrin (BENEFIBER DRINK MIX PO), Take 1 unit marking on U-100 syringe by mouth 1 teaspoon daily, Disp: , Rfl:   •  NOVOLOG FLEXPEN 100 units/mL injection pen, Inject 24 Units under the skin daily with dinner Take 26-2 8units at dinner  15 units at breakfast  17 units at lunch , Disp: , Rfl: 3    Past Medical History:   Diagnosis Date   • Diabetes mellitus ([address] )    • Hyperlipidemia    • Hypertension    • Kidney stone      Past Surgical History:   Procedure Laterality Date   • APPENDECTOMY     • COLONOSCOPY     • FRACTURE SURGERY      elbow   • HERNIA REPAIR     • KIDNEY STONE SURGERY Left    • SD COLONOSCOPY FLX DX W/COLLJ SPEC WHEN PFRMD N/A 10/27/2017    Procedure: COLONOSCOPY;  Surgeon: Laura Angulo MD;  Location: [facility];   Service: Colorectal     Family History   Problem Relation Age of Onset   • Breast cancer Mother    • Diabetes type II Mother    • Colon cancer Father    • Diabetes type II Brother      Social History   reports that he has never smoked  He has never used smokeless tobacco  He reports that he does not drink alcohol and does not use drugs  Review of Systems   Constitutional: Negative for fever  Respiratory: Negative for chest tightness, shortness of breath and wheezing  Cardiovascular: Negative for chest pain, palpitations and leg swelling  Skin: Negative for rash  Neurological: Negative for syncope  Hematological: Does not bruise/bleed easily  Objective:     Vitals:    03/14/23 0855   BP: 106/78   Pulse: 96     Physical Exam  Vitals reviewed  Constitutional:       General: He is not in acute distress  HENT:      Head: Normocephalic  Cardiovascular:      Rate and Rhythm: Normal rate and regular rhythm  Heart sounds: S1 normal and S2 normal  No murmur heard  Pulmonary:      Breath sounds: No wheezing or rhonchi  Musculoskeletal:      Right lower leg: No edema  Left lower leg: No edema  Skin:     General: Skin is warm  Neurological:      Mental Status: He is alert  Mental status is at baseline  Psychiatric:         Mood and Affect: Mood normal          Pertinent Laboratory/Diagnostic Studies:    Laboratory studies reviewed personally by Mar Walls MD    BMImaging Studies:     Pertinent imaging studies and cardiac studies were independently reviewed on this visit and findings summarized  Visit diagnoses  1  Coronary artery disease involving native coronary artery of native heart without angina pectoris        2  Benign essential hypertension        3  Mixed hyperlipidemia  Lipid Panel with Direct LDL reflex          Nile Medeiros MD, [address] [facility]    Portions of the record may have been created with voice recognition software  Occasional wrong word or "sound alike" substitutions may have occurred due to the inherent limitations of voice recognition software    Read the chart carefully and recognize, using context, where substitutions have occurred  Please reach out to me directly for any clarifications

## 2023-05-07 DIAGNOSIS — I10 BENIGN ESSENTIAL HYPERTENSION: ICD-10-CM

## 2023-05-07 DIAGNOSIS — I25.10 CORONARY ARTERY DISEASE INVOLVING NATIVE CORONARY ARTERY OF NATIVE HEART WITHOUT ANGINA PECTORIS: ICD-10-CM

## 2023-05-08 RX ORDER — METOPROLOL SUCCINATE 25 MG/1
TABLET, EXTENDED RELEASE ORAL
Qty: 90 TABLET | Refills: 0 | Status: SHIPPED | OUTPATIENT
Start: 2023-05-08

## 2023-05-22 ENCOUNTER — PATIENT MESSAGE (OUTPATIENT)
Dept: FAMILY MEDICINE CLINIC | Facility: CLINIC | Age: 68
End: 2023-05-22

## 2023-05-22 DIAGNOSIS — Z72.820 SLEEP DEPRIVATION: ICD-10-CM

## 2023-05-23 RX ORDER — ARMODAFINIL 50 MG/1
200 TABLET ORAL DAILY
Qty: 360 TABLET | Refills: 0 | Status: SHIPPED | OUTPATIENT
Start: 2023-05-23 | End: 2023-05-24 | Stop reason: SDUPTHER

## 2023-05-24 RX ORDER — ARMODAFINIL 50 MG/1
200 TABLET ORAL DAILY
Qty: 360 TABLET | Refills: 3 | Status: SHIPPED | OUTPATIENT
Start: 2023-05-24 | End: 2023-08-22

## 2023-05-24 NOTE — TELEPHONE ENCOUNTER
From: Viola Florez  To: James Islas  Sent: 5/22/2023 3:38 PM EDT  Subject: Armodafinil 50 mg, 360 tablets    Evelio Vargas,   My last 80 day, 360 tablet prescription for Modafinil 50 mg is due for refill on May 23rd ([facility] prescription number [id number])  However, I am going to be changing the Pharmacy from [facility] in [place] to [facility] in [place] b/c of cost, [facility] has gone from a [employer] of $91 to $152 for the subject script while the [facility] price is $87 (which is actually less than my last purchase of this drug from [facility])  The [facility] address is [address] [place] [address], [address] [facility] and the phone number is [phone]  Please send the new script to the [facility] in [place]  Hope you are well    Best Regards, Jacinto Moeller

## 2023-05-26 ENCOUNTER — TELEPHONE (OUTPATIENT)
Dept: FAMILY MEDICINE CLINIC | Facility: CLINIC | Age: 68
End: 2023-05-26

## 2023-06-02 NOTE — TELEPHONE ENCOUNTER
Left message for patient to return call  Appeal submitted over the phone  Info goes to clinical review team and determination will be faxed to office

## 2023-08-04 DIAGNOSIS — I25.10 CORONARY ARTERY DISEASE INVOLVING NATIVE CORONARY ARTERY OF NATIVE HEART WITHOUT ANGINA PECTORIS: ICD-10-CM

## 2023-08-04 DIAGNOSIS — I10 BENIGN ESSENTIAL HYPERTENSION: ICD-10-CM

## 2023-08-04 RX ORDER — METOPROLOL SUCCINATE 25 MG/1
TABLET, EXTENDED RELEASE ORAL
Qty: 90 TABLET | Refills: 0 | Status: SHIPPED | OUTPATIENT
Start: 2023-08-04

## 2023-08-10 ENCOUNTER — TELEPHONE (OUTPATIENT)
Dept: FAMILY MEDICINE CLINIC | Facility: CLINIC | Age: 68
End: 2023-08-10

## 2023-08-10 DIAGNOSIS — Z72.820 SLEEP DEPRIVATION: ICD-10-CM

## 2023-08-10 RX ORDER — ARMODAFINIL 50 MG/1
200 TABLET ORAL DAILY
Qty: 360 TABLET | Refills: 3 | Status: CANCELLED | OUTPATIENT
Start: 2023-08-10 | End: 2023-11-08

## 2023-08-10 NOTE — TELEPHONE ENCOUNTER
Left message for patient to return call. He left 2 messages and stopped at the office today in regards to armodafinil refill. He was given 90 day supply 05/24/2023 with 3 refills. The rx is not able to be filled for another 12 days. (is he taking more than 4 daily)? Please inform patient that he does have refills and he will need to call the pharmacy when he is due for the prescription. I called and spoke with the pharmacist to confirm this.

## 2023-11-13 DIAGNOSIS — Z72.820 SLEEP DEPRIVATION: ICD-10-CM

## 2023-11-13 NOTE — TELEPHONE ENCOUNTER
Reason for call:   [x] Refill   [] Prior Auth  [] Other:     Office:   [x] PCP/Provider -   [] Specialty/Provider -     Medication: armodafinil     Dose/Frequency: 50 mg/ 4 tabs daily     Quantity: 90 day supply     Pharmacy: [address] [place] [address]     Does the patient have enough for 3 days?    [x] Yes   [] No - Send as HP to POD

## 2023-11-14 RX ORDER — ARMODAFINIL 50 MG/1
200 TABLET ORAL DAILY
Qty: 360 TABLET | Refills: 0 | Status: SHIPPED | OUTPATIENT
Start: 2023-11-14 | End: 2024-02-12

## 2023-11-14 NOTE — TELEPHONE ENCOUNTER
1 [id number] 08/11/2023 05/24/2023 Armodafinil (Tablet) 360.0 90 50 MG NA [address] [facility] 1 / 3 PA

## 2023-11-27 ENCOUNTER — OFFICE VISIT (OUTPATIENT)
Dept: FAMILY MEDICINE CLINIC | Facility: CLINIC | Age: 68
End: 2023-11-27
Payer: MEDICARE

## 2023-11-27 VITALS
TEMPERATURE: 98.5 F | DIASTOLIC BLOOD PRESSURE: 78 MMHG | HEART RATE: 80 BPM | HEIGHT: 69 IN | RESPIRATION RATE: 17 BRPM | OXYGEN SATURATION: 98 % | WEIGHT: 170 LBS | SYSTOLIC BLOOD PRESSURE: 128 MMHG | BODY MASS INDEX: 25.18 KG/M2

## 2023-11-27 DIAGNOSIS — Z72.820 SLEEP DEPRIVATION: ICD-10-CM

## 2023-11-27 DIAGNOSIS — R97.20 ELEVATED PSA, LESS THAN 10 NG/ML: ICD-10-CM

## 2023-11-27 DIAGNOSIS — E03.9 ACQUIRED HYPOTHYROIDISM: ICD-10-CM

## 2023-11-27 DIAGNOSIS — Z79.4 TYPE 2 DIABETES MELLITUS WITH COMPLICATION, WITH LONG-TERM CURRENT USE OF INSULIN (HCC): Primary | ICD-10-CM

## 2023-11-27 DIAGNOSIS — M18.12 ARTHRITIS OF CARPOMETACARPAL (CMC) JOINT OF LEFT THUMB: ICD-10-CM

## 2023-11-27 DIAGNOSIS — D35.2 PITUITARY MICROADENOMA WITH HYPERPROLACTINEMIA (HCC): ICD-10-CM

## 2023-11-27 DIAGNOSIS — N20.0 NEPHROLITHIASIS: ICD-10-CM

## 2023-11-27 DIAGNOSIS — I10 BENIGN ESSENTIAL HYPERTENSION: ICD-10-CM

## 2023-11-27 DIAGNOSIS — E78.2 MIXED HYPERLIPIDEMIA: ICD-10-CM

## 2023-11-27 DIAGNOSIS — I25.10 CORONARY ARTERY DISEASE INVOLVING NATIVE CORONARY ARTERY OF NATIVE HEART WITHOUT ANGINA PECTORIS: ICD-10-CM

## 2023-11-27 DIAGNOSIS — E11.8 TYPE 2 DIABETES MELLITUS WITH COMPLICATION, WITH LONG-TERM CURRENT USE OF INSULIN (HCC): Primary | ICD-10-CM

## 2023-11-27 DIAGNOSIS — Z00.00 MEDICARE ANNUAL WELLNESS VISIT, SUBSEQUENT: ICD-10-CM

## 2023-11-27 DIAGNOSIS — E22.9 PITUITARY MICROADENOMA WITH HYPERPROLACTINEMIA (HCC): ICD-10-CM

## 2023-11-27 PROCEDURE — 99214 OFFICE O/P EST MOD 30 MIN: CPT | Performed by: FAMILY MEDICINE

## 2023-11-27 PROCEDURE — G0438 PPPS, INITIAL VISIT: HCPCS | Performed by: FAMILY MEDICINE

## 2023-11-27 RX ORDER — ATORVASTATIN CALCIUM 10 MG/1
10 TABLET, FILM COATED ORAL DAILY
Qty: 30 TABLET | Refills: 1 | Status: SHIPPED | OUTPATIENT
Start: 2023-11-27 | End: 2024-02-25

## 2023-11-27 RX ORDER — FENOFIBRATE 48 MG/1
48 TABLET, COATED ORAL DAILY
Qty: 30 TABLET | Refills: 5 | Status: SHIPPED | OUTPATIENT
Start: 2023-11-27

## 2023-11-27 NOTE — PATIENT INSTRUCTIONS
Medicare Preventive Visit Patient Instructions  Thank you for completing your Welcome to Medicare Visit or Medicare Annual Wellness Visit today. Your next wellness visit will be due in one year (11/27/2024). The screening/preventive services that you may require over the next 5-10 years are detailed below. Some tests may not apply to you based off risk factors and/or age. Screening tests ordered at today's visit but not completed yet may show as past due. Also, please note that scanned in results may not display below. Preventive Screenings:  Service Recommendations Previous Testing/Comments   Colorectal Cancer Screening  Colonoscopy    Fecal Occult Blood Test (FOBT)/Fecal Immunochemical Test (FIT)  Fecal DNA/Cologuard Test  Flexible Sigmoidoscopy Age: 43-73 years old   Colonoscopy: every 10 years (May be performed more frequently if at higher risk)  OR  FOBT/FIT: every 1 year  OR  Cologuard: every 3 years  OR  Sigmoidoscopy: every 5 years  Screening may be recommended earlier than age 39 if at higher risk for colorectal cancer. Also, an individualized decision between you and your healthcare provider will decide whether screening between the ages of 77-80 would be appropriate.  Colonoscopy: 01/25/2023  FOBT/FIT: Not on file  Cologuard: Not on file  Sigmoidoscopy: Not on file    Screening Current     Prostate Cancer Screening Individualized decision between patient and health care provider in men between ages of 53-66   Medicare will cover every 12 months beginning on the day after your 50th birthday PSA: No results in last 5 years           Hepatitis C Screening Once for adults born between 1945 and 1965  More frequently in patients at high risk for Hepatitis C Hep C Antibody: 06/12/2017    Screening Current   Diabetes Screening 1-2 times per year if you're at risk for diabetes or have pre-diabetes Fasting glucose: No results in last 5 years (No results in last 5 years)  A1C: 6.4 % (8/15/2023)  Screening Not Indicated  History Diabetes   Cholesterol Screening Once every 5 years if you don't have a lipid disorder. May order more often based on risk factors. Lipid panel: Not on file  Screening Not Indicated  History Lipid Disorder      Other Preventive Screenings Covered by Medicare:  Abdominal Aortic Aneurysm (AAA) Screening: covered once if your at risk. You're considered to be at risk if you have a family history of AAA or a male between the age of 70-76 who smoking at least 100 cigarettes in your lifetime. Lung Cancer Screening: covers low dose CT scan once per year if you meet all of the following conditions: (1) Age 48-67; (2) No signs or symptoms of lung cancer; (3) Current smoker or have quit smoking within the last 15 years; (4) You have a tobacco smoking history of at least 20 pack years (packs per day x number of years you smoked); (5) You get a written order from a healthcare provider. Glaucoma Screening: covered annually if you're considered high risk: (1) You have diabetes OR (2) Family history of glaucoma OR (3)  aged 48 and older OR (3)  American aged 72 and older  Osteoporosis Screening: covered every 2 years if you meet one of the following conditions: (1) Have a vertebral abnormality; (2) On glucocorticoid therapy for more than 3 months; (3) Have primary hyperparathyroidism; (4) On osteoporosis medications and need to assess response to drug therapy. HIV Screening: covered annually if you're between the age of 14-79. Also covered annually if you are younger than 13 and older than 72 with risk factors for HIV infection. For pregnant patients, it is covered up to 3 times per pregnancy.     Immunizations:  Immunization Recommendations   Influenza Vaccine Annual influenza vaccination during flu season is recommended for all persons aged >= 6 months who do not have contraindications   Pneumococcal Vaccine   * Pneumococcal conjugate vaccine = PCV13 (Prevnar 13), PCV15 (Vaxneuvance), PCV20 (Prevnar 20)  * Pneumococcal polysaccharide vaccine = PPSV23 (Pneumovax) Adults 37-02 yo with certain risk factors or if 69+ yo  If never received any pneumonia vaccine: recommend Prevnar 20 (PCV20)  Give PCV20 if previously received 1 dose of PCV13 or PPSV23   Hepatitis B Vaccine 3 dose series if at intermediate or high risk (ex: diabetes, end stage renal disease, liver disease)   Respiratory syncytial virus (RSV) Vaccine - COVERED BY MEDICARE PART D  * RSVPreF3 (Arexvy) CDC recommends that adults 61years of age and older may receive a single dose of RSV vaccine using shared clinical decision-making (SCDM)   Tetanus (Td) Vaccine - COST NOT COVERED BY MEDICARE PART B Following completion of primary series, a booster dose should be given every 10 years to maintain immunity against tetanus. Td may also be given as tetanus wound prophylaxis. Tdap Vaccine - COST NOT COVERED BY MEDICARE PART B Recommended at least once for all adults. For pregnant patients, recommended with each pregnancy. Shingles Vaccine (Shingrix) - COST NOT COVERED BY MEDICARE PART B  2 shot series recommended in those 19 years and older who have or will have weakened immune systems or those 50 years and older     Health Maintenance Due:      Topic Date Due   • Colorectal Cancer Screening  01/24/2028   • Hepatitis C Screening  Completed     Immunizations Due:      Topic Date Due   • COVID-19 Vaccine (2 - Booster for Imelda series) 06/28/2021   • Influenza Vaccine (1) 09/01/2023     Advance Directives   What are advance directives? Advance directives are legal documents that state your wishes and plans for medical care. These plans are made ahead of time in case you lose your ability to make decisions for yourself. Advance directives can apply to any medical decision, such as the treatments you want, and if you want to donate organs. What are the types of advance directives?   There are many types of advance directives, and each state has rules about how to use them. You may choose a combination of any of the following:  Living will: This is a written record of the treatment you want. You can also choose which treatments you do not want, which to limit, and which to stop at a certain time. This includes surgery, medicine, IV fluid, and tube feedings. Durable power of  for [facility]): This is a written record that states who you want to make healthcare choices for you when you are unable to make them for yourself. This person, called a proxy, is usually a family member or a friend. You may choose more than 1 proxy. Do not resuscitate (DNR) order:  A DNR order is used in case your heart stops beating or you stop breathing. It is a request not to have certain forms of treatment, such as CPR. A DNR order may be included in other types of advance directives. Medical directive: This covers the care that you want if you are in a coma, near death, or unable to make decisions for yourself. You can list the treatments you want for each condition. Treatment may include pain medicine, surgery, blood transfusions, dialysis, IV or tube feedings, and a ventilator (breathing machine). Values history: This document has questions about your views, beliefs, and how you feel and think about life. This information can help others choose the care that you would choose. Why are advance directives important? An advance directive helps you control your care. Although spoken wishes may be used, it is better to have your wishes written down. Spoken wishes can be misunderstood, or not followed. Treatments may be given even if you do not want them. An advance directive may make it easier for your family to make difficult choices about your care.    Weight Management   Why it is important to manage your weight:  Being overweight increases your risk of health conditions such as heart disease, high blood pressure, type 2 diabetes, and certain types of cancer. It can also increase your risk for osteoarthritis, sleep apnea, and other respiratory problems. Aim for a slow, steady weight loss. Even a small amount of weight loss can lower your risk of health problems. How to lose weight safely:  A safe and healthy way to lose weight is to eat fewer calories and get regular exercise. You can lose up about 1 pound a week by decreasing the number of calories you eat by 500 calories each day. Healthy meal plan for weight management:  A healthy meal plan includes a variety of foods, contains fewer calories, and helps you stay healthy. A healthy meal plan includes the following:  Eat whole-grain foods more often. A healthy meal plan should contain fiber. Fiber is the part of grains, fruits, and vegetables that is not broken down by your body. Whole-grain foods are healthy and provide extra fiber in your diet. Some examples of whole-grain foods are whole-wheat breads and pastas, oatmeal, brown rice, and bulgur. Eat a variety of vegetables every day. Include dark, leafy greens such as spinach, kale, kaylyn greens, and mustard greens. Eat yellow and orange vegetables such as carrots, sweet potatoes, and winter squash. Eat a variety of fruits every day. Choose fresh or canned fruit (canned in its own juice or light syrup) instead of juice. Fruit juice has very little or no fiber. Eat low-fat dairy foods. Drink fat-free (skim) milk or 1% milk. Eat fat-free yogurt and low-fat cottage cheese. Try low-fat cheeses such as mozzarella and other reduced-fat cheeses. Choose meat and other protein foods that are low in fat. Choose beans or other legumes such as split peas or lentils. Choose fish, skinless poultry (chicken or turkey), or lean cuts of red meat (beef or pork). Before you cook meat or poultry, cut off any visible fat. Use less fat and oil. Try baking foods instead of frying them.  Add less fat, such as margarine, sour cream, regular salad dressing and mayonnaise to foods. Eat fewer high-fat foods. Some examples of high-fat foods include french fries, doughnuts, ice cream, and cakes. Eat fewer sweets. Limit foods and drinks that are high in sugar. This includes candy, cookies, regular soda, and sweetened drinks. Exercise:  Exercise at least 30 minutes per day on most days of the week. Some examples of exercise include walking, biking, dancing, and swimming. You can also fit in more physical activity by taking the stairs instead of the elevator or parking farther away from stores. Ask your healthcare provider about the best exercise plan for you. © Copyright [employer] 2018 Information is for End User's use only and may not be sold, redistributed or otherwise used for commercial purposes.  All illustrations and images included in CareNotes® are the copyrighted property of [employer] or [address] [place] [address]

## 2023-11-27 NOTE — PROGRESS NOTES
Assessment and Plan:     Problem List Items Addressed This Visit        Endocrine    Type 2 diabetes mellitus with complication, with long-term current use of insulin ([address]) - Primary       Lab Results   Component Value Date    HGBA1C 6.4 (H) 08/15/2023   Follows with endocrinology  Takes Lantus 20U at bedtime, plus mealtime insulin 15-26U. In addition he takes metformin 500 mg twice daily and Trulicity 1.5 mg weekly           Relevant Medications    fenofibrate (TRICOR) 48 mg tablet    atorvastatin (LIPITOR) 10 mg tablet    Other Relevant Orders    Albumin / creatinine urine ratio    Comprehensive metabolic panel    Lipid panel    Pituitary microadenoma with hyperprolactinemia ([address])    Acquired hypothyroidism     Follows with endocrine and takes levothyroxine 25 mcg for symptomatic subclinical hypothyroid            Cardiovascular and Mediastinum    Benign essential hypertension    Coronary artery disease involving native coronary artery of native heart without angina pectoris     Diagnosed with coronary artery calcium score  Follows with cardiology  Continue statin, aspirin, beta blocker            Genitourinary    Nephrolithiasis       Other    Mixed hyperlipidemia    Relevant Medications    fenofibrate (TRICOR) 48 mg tablet    atorvastatin (LIPITOR) 10 mg tablet    Elevated PSA, less than 10 ng/ml    Sleep deprivation   Other Visit Diagnoses     Medicare annual wellness visit, subsequent        Arthritis of carpometacarpal (CMC) joint of left thumb        Relevant Orders    Ambulatory Referral to Hand Surgery        Discussed need for statin medication in detail with patient. He would like to trial fenofibrate due to underlying fatty liver. His triglycerides are currently within normal limits. With his underlying joint pain we will decrease atorvastatin to 10 mg every other day and assess his response. He is agreeable and we will repeat labs in 6 months. AWV and follow-up completed today.   Reviewed previous labs.  Chronic conditions stable. Continue current medications. Depression Screening and Follow-up Plan: Patient was screened for depression during today's encounter. They screened negative with a PHQ-2 score of 0. Preventive health issues were discussed with patient, and age appropriate screening tests were ordered as noted in patient's After Visit Summary. Personalized health advice and appropriate referrals for health education or preventive services given if needed, as noted in patient's After Visit Summary. History of Present Illness:     Patient presents for a Medicare Wellness Visit    Awv and follow up     Concerns over joint pain in hands with cholesterol medication. Follows with endocrinology at [facility]. Had tried crestor which caused severe joint pain. Patient Care Team:  Xenia Lai MD as PCP - General (Family Medicine)  Kelsie Hernandez. Darin Peters MD as PCP - PCP-[facility] Tiffanie Milan MD as Endoscopist     Review of Systems:     Review of Systems   Constitutional:  Negative for fatigue and fever. HENT:  Negative for sore throat. Eyes:  Negative for visual disturbance. Respiratory:  Negative for cough, chest tightness and shortness of breath. Cardiovascular:  Negative for chest pain, palpitations and leg swelling. Gastrointestinal:  Negative for abdominal pain, constipation, diarrhea and nausea. Endocrine: Negative for cold intolerance and heat intolerance. Genitourinary:  Negative for flank pain. Musculoskeletal:  Positive for arthralgias. Negative for back pain and neck pain. Skin:  Negative for rash. Neurological:  Negative for headaches. Psychiatric/Behavioral:  Negative for behavioral problems and confusion.          Problem List:     Patient Active Problem List   Diagnosis   • Benign essential hypertension   • Type 2 diabetes mellitus with complication, with long-term current use of insulin (HCC)   • Mixed hyperlipidemia   • Erectile dysfunction of non-organic origin   • Nephrolithiasis   • Pituitary microadenoma with hyperprolactinemia (HCC)   • Elevated PSA, less than 10 ng/ml   • Vitamin D deficiency   • BMI 26.0-26.9,adult   • Acquired hypothyroidism   • Sleep deprivation   • Coronary artery disease involving native coronary artery of native heart without angina pectoris      Past Medical and Surgical History:     Past Medical History:   Diagnosis Date   • Arthritis     left wrist & thumb   • Diabetes mellitus ([address])    • Hyperlipidemia    • Hypertension    • Kidney stone      Past Surgical History:   Procedure Laterality Date   • APPENDECTOMY     • COLONOSCOPY     • FRACTURE SURGERY      elbow   • HERNIA REPAIR     • KIDNEY STONE SURGERY Left    • ND COLONOSCOPY FLX DX W/COLLJ SPEC WHEN PFRMD N/A 10/27/2017    Procedure: COLONOSCOPY;  Surgeon: Cat Wetzel MD;  [facility];   Service: Colorectal      Family History:     Family History   Problem Relation Age of Onset   • Breast cancer Mother          in 26 @ 61 yrs   • Diabetes type II Mother    • Hypertension Mother          at 61 (1987) from breast cancer   • Colon cancer Father          in 5 @ 46 yrs   • Hypertension Father          at 46 (1979) from cancer   • Diabetes type II Brother    • Hypertension Brother         Age 47   • Heart disease Brother         2 stents   • Diabetes Brother    • Hypertension Brother          at 48 (2008) from cancer   • Diabetes Brother          from [address]    • Hypertension Brother         Age 71   • Diabetes Brother    • Glaucoma Brother            • Hypertension Brother         Age 79   • Diabetes Brother    • Heart disease Brother         Open heart surgery 2021      Social History:     Social History     Socioeconomic History   • Marital status: /Civil Union     Spouse name: None   • Number of children: None   • Years of education: None • Highest education level: None   Occupational History   • None   Tobacco Use   • Smoking status: Never   • Smokeless tobacco: Never   • Tobacco comments:     Never used it   Vaping Use   • Vaping Use: Never used   Substance and Sexual Activity   • Alcohol use: No   • Drug use: No   • Sexual activity: Yes     Partners: Female     Birth control/protection: None   Other Topics Concern   • None   Social History Narrative   • None     Social Determinants of Health     Financial Resource Strain: Low Risk  (11/26/2023)    Overall Financial Resource Strain (CARDIA)    • Difficulty of Paying Living Expenses: Not hard at all   Food Insecurity: Not on file   Transportation Needs: No Transportation Needs (11/26/2023)    PRAPARE - Transportation    • Lack of Transportation (Medical): No    • Lack of Transportation (Non-Medical):  No   Physical Activity: Not on file   Stress: Not on file   Social Connections: Not on file   Intimate Partner Violence: Not on file   Housing Stability: Not on file      Medications and Allergies:     Current Outpatient Medications   Medication Sig Dispense Refill   • allopurinol (ZYLOPRIM) 300 mg tablet Take 300 mg by mouth daily As directed  3   • Armodafinil 50 MG tablet Take 4 tablets (200 mg total) by mouth daily 360 tablet 0   • aspirin 325 mg tablet Take 325 mg by mouth daily     • atorvastatin (LIPITOR) 10 mg tablet Take 1 tablet (10 mg total) by mouth daily 30 tablet 1   • BD PEN NEEDLE DARIUS U/F 32G X 4 MM MISC USE 2 DAILY  3   • chlorthalidone 25 mg tablet Take 25 mg by mouth 2 (two) times a day     • Cholecalciferol 25 MCG (1000 UT) capsule Take 1,000 Units by mouth daily     • Dulaglutide (Trulicity) 1.5 AG/7.2RU SOPN Inject 1.5 mg under the skin once a week     • fenofibrate (TRICOR) 48 mg tablet Take 1 tablet (48 mg total) by mouth daily 30 tablet 5   • FREESTYLE LITE test strip use 1 TEST STRIP to TEST BLOOD SUGAR four times a day     • Glucagon (Baqsimi One Pack) 3 MG/DOSE POWD 3 mg • HumaLOG KwikPen 100 units/mL injection pen 15 units in the morning, 17 units at lunch, 26-28 units at dinner     • Lancets (freestyle) lancets use 1 LANCET to TEST BLOOD SUGAR four times a day     • Lantus SoloStar 100 units/mL injection pen 20 Units daily     • latanoprost (XALATAN) 0.005 % ophthalmic solution Administer 1 drop to both eyes daily at bedtime     • levothyroxine 25 mcg tablet Take 25 mcg by mouth daily  3   • metFORMIN (GLUCOPHAGE) 500 mg tablet Take 500 mg by mouth 2 (two) times a day with meals     • metoprolol succinate (TOPROL-XL) 25 mg 24 hr tablet TAKE 1 TABLET BY MOUTH EVERYDAY AT BEDTIME 90 tablet 0   • potassium citrate (UROCIT-K) 10 mEq Take 10 mEq by mouth 3 (three) times a day     • Psyllium (METAMUCIL FIBER PO) Take 1 Units/oz by mouth 1 teaspoon daily     • telmisartan (MICARDIS) 80 MG tablet TAKE 1 TABLET BY MOUTH EVERY DAY 90 tablet 3   • Wheat Dextrin (BENEFIBER DRINK MIX PO) Take 1 unit marking on U-100 syringe by mouth 1 teaspoon daily       No current facility-administered medications for this visit.      Allergies   Allergen Reactions   • Pollen Extract Other (See Comments)      Immunizations:     Immunization History   Administered Date(s) Administered   • COVID-19 J&J (Imelda) vaccine 0.5 mL 05/03/2021   • Hep B, adult 11/11/2019   • Influenza, high dose seasonal 0.7 mL 11/16/2021, 11/25/2022   • Influenza, injectable, quadrivalent, preservative free 0.5 mL 11/11/2019, 10/03/2020   • Influenza, recombinant, quadrivalent,injectable, preservative free 10/03/2018   • Pneumococcal Conjugate Vaccine 20-valent (Pcv20), Polysace 11/25/2022   • Pneumococcal Polysaccharide PPV23 10/03/2018   • Td (adult), adsorbed 05/09/2000   • Tdap 07/31/2019   • Zoster Vaccine Recombinant 03/28/2019, 07/02/2019      Health Maintenance:         Topic Date Due   • Colorectal Cancer Screening  01/24/2028   • Hepatitis C Screening  Completed         Topic Date Due   • COVID-19 Vaccine (2 - Booster for Imelda series) 2021   • Influenza Vaccine (1) 2023      Medicare Screening Tests and Risk Assessments:     Caroleen Apgar is here for his Subsequent Wellness visit. Last Medicare Wellness visit information reviewed, patient interviewed and updates made to the record today. Health Risk Assessment:   Patient rates overall health as good. Patient feels that their physical health rating is slightly worse. Patient is satisfied with their life. Eyesight was rated as same. Hearing was rated as same. Patient feels that their emotional and mental health rating is slightly better. Patients states they are sometimes angry. Patient states they are sometimes unusually tired/fatigued. Pain experienced in the last 7 days has been a lot. Patient's pain rating has been 7/10. Patient states that he has experienced no weight loss or gain in last 6 months. Depression Screening:   PHQ-2 Score: 0      Fall Risk Screening: In the past year, patient has experienced: no history of falling in past year      Home Safety:  Patient does not have trouble with stairs inside or outside of their home. Patient has working smoke alarms and has working carbon monoxide detector. Home safety hazards include: none. Nutrition:   Current diet is Diabetic. Medications:   Patient is not currently taking any over-the-counter supplements. Patient is able to manage medications. Activities of Daily Living (ADLs)/Instrumental Activities of Daily Living (IADLs):   Walk and transfer into and out of bed and chair?: Yes  Dress and groom yourself?: Yes    Bathe or shower yourself?: Yes    Feed yourself?  Yes  Do your laundry/housekeeping?: Yes  Manage your money, pay your bills and track your expenses?: Yes  Make your own meals?: Yes    Do your own shopping?: Yes    Durable Medical Equipment Suppliers  none    Previous Hospitalizations:   Any hospitalizations or ED visits within the last 12 months?: No      Hospitalization Comments: none    Advance Care Planning:   Living will: Yes    Durable POA for healthcare: Yes    Advanced directive: Yes      PREVENTIVE SCREENINGS      Cardiovascular Screening:    General: Screening Not Indicated and History Lipid Disorder      Diabetes Screening:     General: Screening Not Indicated and History Diabetes      Colorectal Cancer Screening:     General: Screening Current      Abdominal Aortic Aneurysm (AAA) Screening:    Risk factors include: age between 70-77 yo        Lung Cancer Screening:     General: Screening Not Indicated      Hepatitis C Screening:    General: Screening Current    Screening, Brief Intervention, and Referral to Treatment (SBIRT)    Screening  Typical number of drinks in a day: 0  Typical number of drinks in a week: 0  Interpretation: Low risk drinking behavior. AUDIT-C Screenin) How often did you have a drink containing alcohol in the past year? never  2) How many drinks did you have on a typical day when you were drinking in the past year? 0  3) How often did you have 6 or more drinks on one occasion in the past year? never    AUDIT-C Score: 0  Interpretation: Score 0-3 (male): Negative screen for alcohol misuse    Single Item Drug Screening:  How often have you used an illegal drug (including marijuana) or a prescription medication for non-medical reasons in the past year? never    Single Item Drug Screen Score: 0  Interpretation: Negative screen for possible drug use disorder    Annual Depression Screening  Time spent screening and evaluating the patient for depression during today's encounter was 5 minutes. Other Counseling Topics:   Car/seat belt/driving safety, skin self-exam, sunscreen and regular weightbearing exercise and calcium and vitamin D intake. No results found.      Physical Exam:     /78 (BP Location: Left arm, Patient Position: Sitting, Cuff Size: Standard)   Pulse 80   Temp 98.5 °F (36.9 °C) (Tympanic)   Resp 17   Ht 5' 9" (1.753 m)   Wt 77.1 kg (170 lb)   SpO2 98%   BMI 25.10 kg/m²     Physical Exam  Vitals and nursing note reviewed. Constitutional:       Appearance: Normal appearance. He is well-developed. HENT:      Head: Normocephalic and atraumatic. Eyes:      Extraocular Movements: Extraocular movements intact. Pupils: Pupils are equal, round, and reactive to light. Cardiovascular:      Rate and Rhythm: Normal rate and regular rhythm. Pulses: no weak pulses  Pulmonary:      Effort: Pulmonary effort is normal.      Breath sounds: Normal breath sounds. Abdominal:      General: Bowel sounds are normal.      Palpations: Abdomen is soft. Musculoskeletal:         General: Tenderness present. Cervical back: Normal range of motion. Feet:      Right foot:      Skin integrity: No ulcer, skin breakdown, erythema, warmth, callus or dry skin. Left foot:      Skin integrity: No ulcer, skin breakdown, erythema, warmth, callus or dry skin. Skin:     General: Skin is warm and dry. Neurological:      General: No focal deficit present. Mental Status: He is alert and oriented to person, place, and time. Psychiatric:         Mood and Affect: Mood normal.         Speech: Speech normal.         Behavior: Behavior normal.        Diabetic Foot Exam    Patient's shoes and socks removed. Right Foot/Ankle   Right Foot Inspection  Skin Exam: skin normal and skin intact. No dry skin, no warmth, no callus, no erythema, no maceration, no abnormal color, no pre-ulcer, no ulcer and no callus. Left Foot/Ankle  Left Foot Inspection  Skin Exam: skin normal and skin intact. No dry skin, no warmth, no erythema, no maceration, normal color, no pre-ulcer, no ulcer and no callus.      Assign Risk Category  No deformity present  No loss of protective sensation  No weak pulses  Risk: 0      Wendy Neri MD

## 2023-11-28 ENCOUNTER — TELEPHONE (OUTPATIENT)
Dept: ADMINISTRATIVE | Facility: OTHER | Age: 68
End: 2023-11-28

## 2023-11-28 NOTE — LETTER
Diabetic Foot Exam Form    Date Requested: 23  Patient: Cindy Erazo  Patient : 1955   Referring Provider: Gracy Schlatter, MD    Diabetic Foot Exam Performed with shoes and socks removed        Yes         No     Date of Diabetic Foot Exam ______________________________  Risk Score ____________________________________________    Left Foot       Visual Inspection         Monofilament Testing Sensory Exam        Pedal Pulses         Additional Comments         Right Foot      Visual Inspection         Monofilament Testing Sensory Exam       Pedal Pulses         Additional Comments         Comments __________________________________________________________    Practice Providing Exam ______________________________________________    Exam Performed By (print name) _______________________________________      Provider Signature ___________________________________________________      These reports are needed for  compliance. Please fax this completed form and a copy of the Diabetic Foot Exam report to our office located at [address] [place] [address] as soon as possible via Fax [phone] karen Encarnacion: Phone [phone]    We thank you for your assistance in treating our mutual patient.

## 2023-11-28 NOTE — LETTER
Diabetic Eye Exam Form    Date Requested: 23  Patient: Edmund Bo  Patient : 1955   Referring Provider: Reginald Collnis MD      DIABETIC Eye Exam Date _______________________________      Type of Exam MUST be documented for Diabetic Eye Exams. Please CHECK ONE. Retinal Exam       Dilated Retinal Exam       OCT       Optomap-Iris Exam      Fundus Photography       Left Eye - Please check Retinopathy or No Retinopathy        Exam did show retinopathy    Exam did not show retinopathy       Right Eye - Please check Retinopathy or No Retinopathy       Exam did show retinopathy    Exam did not show retinopathy       Comments __________________________________________________________    Practice Providing Exam ______________________________________________    Exam Performed By (print name) _______________________________________      Provider Signature ___________________________________________________      These reports are needed for  compliance. Please fax this completed form and a copy of the Diabetic Eye Exam report to our office located at [address] [place] [address] as soon as possible via Fax [phone] attention Roxie Flirt: Phone [phone]  We thank you for your assistance in treating our mutual patient.

## 2023-11-28 NOTE — ASSESSMENT & PLAN NOTE
Lab Results   Component Value Date    HGBA1C 6.4 (H) 08/15/2023   Follows with endocrinology  Takes Lantus 20U at bedtime, plus mealtime insulin 15-26U.  In addition he takes metformin 500 mg twice daily and Trulicity 1.5 mg weekly

## 2023-11-28 NOTE — TELEPHONE ENCOUNTER
Upon review of the In Basket request and the patient's chart, initial outreach has been made via fax to facility. Please see Contacts section for details.      Thank you  Bronson Duenas MA

## 2023-11-28 NOTE — TELEPHONE ENCOUNTER
----- Message from Jose L Eubanks sent at 11/27/2023 11:27 AM EST -----  Regarding: Diabetic Eye Exam  11/27/23 11:27 AM    Hello, our patient Cindy Erazo has had Diabetic Eye Exam completed/performed. Please assist in updating the patient chart by making an External outreach to [employer], OD facility located in [facility]. The date of service is 2023.     Thank you,  Jose L [facility]

## 2023-11-28 NOTE — LETTER
Diabetic Foot Exam Form    Date Requested: 23  Patient: Caitlin Valentino  Patient : 1955   Referring Provider: Hong Roberts MD    Diabetic Foot Exam Performed with shoes and socks removed        Yes         No     Date of Diabetic Foot Exam ______________________________  Risk Score ____________________________________________    Left Foot       Visual Inspection         Monofilament Testing Sensory Exam        Pedal Pulses         Additional Comments         Right Foot      Visual Inspection         Monofilament Testing Sensory Exam       Pedal Pulses         Additional Comments         Comments __________________________________________________________    Practice Providing Exam ______________________________________________    Exam Performed By (print name) _______________________________________      Provider Signature ___________________________________________________      These reports are needed for  compliance. Please fax this completed form and a copy of the Diabetic Foot Exam report to our office located at [address] [place] [address] as soon as possible via Fax [phone] karen Pedro: Phone [phone]    We thank you for your assistance in treating our mutual patient.

## 2023-11-28 NOTE — TELEPHONE ENCOUNTER
----- Message from Blas Diaz sent at 11/27/2023 11:25 AM EST -----  Regarding: Diabetic Foot Exam  11/27/23 11:26 AM    Hello, our patient No patient name on file. has had Diabetic Foot Exam completed/performed. Please assist in updating the patient chart by making an External outreach to [facility] facility located in [facility]. The date of service is 2023.     Thank you,  Blas Diaz  [facility]

## 2023-11-28 NOTE — LETTER
Diabetic Foot Exam Form    Date Requested: 23  Patient: Deng Gaspar  Patient : 1955   Referring Provider: Diane Santizo MD    Diabetic Foot Exam Performed with shoes and socks removed        Yes         No     Date of Diabetic Foot Exam ______________________________  Risk Score ____________________________________________    Left Foot       Visual Inspection         Monofilament Testing Sensory Exam        Pedal Pulses         Additional Comments         Right Foot      Visual Inspection         Monofilament Testing Sensory Exam       Pedal Pulses         Additional Comments         Comments __________________________________________________________    Practice Providing Exam ______________________________________________    Exam Performed By (print name) _______________________________________      Provider Signature ___________________________________________________      These reports are needed for  compliance. Please fax this completed form and a copy of the Diabetic Foot Exam report to our office located at [address] [place] [address] as soon as possible via Fax [phone] karen Alvares: Phone [phone]    We thank you for your assistance in treating our mutual patient.

## 2023-11-29 NOTE — TELEPHONE ENCOUNTER
Upon review of the In Basket request we were able to locate, review, and update the patient chart as requested for Diabetic Eye Exam.    Any additional questions or concerns should be emailed to the Practice Liaisons via the appropriate education email address, please do not reply via In Basket.     Thank you  Norm Cordoba MA

## 2023-11-30 ENCOUNTER — OFFICE VISIT (OUTPATIENT)
Dept: OBGYN CLINIC | Facility: CLINIC | Age: 68
End: 2023-11-30
Payer: MEDICARE

## 2023-11-30 ENCOUNTER — APPOINTMENT (OUTPATIENT)
Dept: RADIOLOGY | Age: 68
End: 2023-11-30
Payer: MEDICARE

## 2023-11-30 VITALS
HEIGHT: 69 IN | DIASTOLIC BLOOD PRESSURE: 74 MMHG | SYSTOLIC BLOOD PRESSURE: 116 MMHG | BODY MASS INDEX: 25.18 KG/M2 | HEART RATE: 89 BPM | WEIGHT: 170 LBS

## 2023-11-30 DIAGNOSIS — M18.12 PRIMARY OSTEOARTHRITIS OF FIRST CARPOMETACARPAL JOINT OF LEFT HAND: ICD-10-CM

## 2023-11-30 DIAGNOSIS — M25.532 PAIN IN LEFT WRIST: ICD-10-CM

## 2023-11-30 DIAGNOSIS — M18.12 ARTHRITIS OF CARPOMETACARPAL (CMC) JOINT OF LEFT THUMB: ICD-10-CM

## 2023-11-30 DIAGNOSIS — M25.532 PAIN IN LEFT WRIST: Primary | ICD-10-CM

## 2023-11-30 DIAGNOSIS — M19.042 PRIMARY OSTEOARTHRITIS OF LEFT HAND: ICD-10-CM

## 2023-11-30 PROCEDURE — 20605 DRAIN/INJ JOINT/BURSA W/O US: CPT | Performed by: PHYSICIAN ASSISTANT

## 2023-11-30 PROCEDURE — 99203 OFFICE O/P NEW LOW 30 MIN: CPT | Performed by: PHYSICIAN ASSISTANT

## 2023-11-30 PROCEDURE — 73110 X-RAY EXAM OF WRIST: CPT

## 2023-11-30 RX ORDER — TRIAMCINOLONE ACETONIDE 40 MG/ML
40 INJECTION, SUSPENSION INTRA-ARTICULAR; INTRAMUSCULAR
Status: COMPLETED | OUTPATIENT
Start: 2023-11-30 | End: 2023-11-30

## 2023-11-30 RX ORDER — LIDOCAINE HYDROCHLORIDE 10 MG/ML
1 INJECTION, SOLUTION INFILTRATION; PERINEURAL
Status: COMPLETED | OUTPATIENT
Start: 2023-11-30 | End: 2023-11-30

## 2023-11-30 RX ADMIN — LIDOCAINE HYDROCHLORIDE 1 ML: 10 INJECTION, SOLUTION INFILTRATION; PERINEURAL at 09:00

## 2023-11-30 RX ADMIN — TRIAMCINOLONE ACETONIDE 40 MG: 40 INJECTION, SUSPENSION INTRA-ARTICULAR; INTRAMUSCULAR at 09:00

## 2023-11-30 NOTE — PATIENT INSTRUCTIONS
Thumb Arthroplasty   WHAT YOU NEED TO KNOW:   What do I need to know about thumb arthroplasty? Thumb arthroplasty is surgery to replace part or all of the joint at the base of your thumb. This joint is where your thumb bone and wrist bone meet. How do I prepare for thumb arthroplasty? Your healthcare provider will talk to you about how to prepare for surgery. He or she may tell you not to eat or drink anything after midnight on the day of your surgery. Your provider will tell you what medicines to take or not take on the day of your surgery. What will happen during thumb arthroplasty? General or regional anesthesia will be given to keep you free from pain during the surgery. Your surgeon will make an incision on the skin over your thumb joint. He or she will remove part or all of your wrist bone. Your surgeon may also remove part of your thumb bone. He or she will reconstruct your joint using cartilage, a tendon taken from your forearm, or an artificial implant. Your surgeon will close your incision with stitches and cover it with bandages. What are the risks of thumb arthroplasty? You may have stiffness in your thumb after surgery. You may bleed more than expected or get an infection. If you received an implant, the implant may get loose, break, or become unstable. CARE AGREEMENT:   You have the right to help plan your care. Learn about your health condition and how it may be treated. Discuss treatment options with your healthcare providers to decide what care you want to receive. You always have the right to refuse treatment. The above information is an  only. It is not intended as medical advice for individual conditions or treatments. Talk to your doctor, nurse or pharmacist before following any medical regimen to see if it is safe and effective for you.   © Copyright [facility] 2023 Information is for End User's use only and may not be sold, redistributed or otherwise used for commercial purposes.

## 2023-11-30 NOTE — PROGRESS NOTES
Orthopaedic Surgery - Office Note  Kandace Castillo (49 y.o. male)   : 1955   MRN: [id number]  Encounter Date: 2023    Chief Complaint   Patient presents with    Left Hand - Pain    Left Wrist - Pain         Assessment/Plan  Diagnoses and all orders for this visit:    Pain in left wrist  -     XR wrist 3+ vw left; Future  -     Medium joint arthrocentesis  -     Ambulatory Referral to Hand Surgery; Future  -     Ambulatory Referral to Occupational Therapy; Future    Primary osteoarthritis of first carpometacarpal joint of left hand  -     XR wrist 3+ vw left; Future  -     Medium joint arthrocentesis  -     Ambulatory Referral to Hand Surgery; Future  -     Ambulatory Referral to Occupational Therapy; Future    Primary osteoarthritis of left hand-trapeziometacarpal metacarpa  -     Ambulatory Referral to Occupational Therapy; Future    Arthritis of carpometacarpal [place]) joint of left thumb  -     Ambulatory Referral to Hand Surgery  -     Ambulatory Referral to Occupational Therapy; Future    Other orders  -     Cancel: Hand/upper extremity injection    The diagnosis as well as treatment options were reviewed with the patient in the office today. Conservative treatment measures for the [address] [place] [address] Dr osteoarthritis which is symptomatic on clinical examination today would include thumb spica brace at night, icing 20 minutes on 1 hour off 3 times a day, and an oral anti-inflammatory such as Aleve 1 tablet twice daily with food stopping and calling if any stomach upset occurs. Tylenol may be used for breakthrough discomfort. In addition I would recommend occupational therapy for thumb strengthening exercises due to the early subluxation seen on x-ray. The risks and benefits of a cortisone injection were reviewed at length today in the office. Shared decision-making was utilized and patient elected to proceed with the injection.   He tolerated the injection well without complication and did note significant decrease in pain postinjection prior to leaving the exam today. Patient would like to follow-up with hand surgeon in case the injection does not help and he would need to discuss surgical treatment options. Return for Recheck with hand surgeon in 3 months. History of Present Illness  This is a new patient with left hand and wrist pain. He has a history of CMC joint osteoarthritis. Patient reports he had cortisone injections in the remote past approximately 1 to 2 years ago. He reports these injections did help but the pain has slowly been returning. He reports pain at the base of his left thumb without any known trauma. Range of motion of the thumb increases his symptoms. He reports gripping activities increase the pain at the base of the thumb. He has not had any recent treatment. He does have a thumb spica brace which she has not been using. No paresthesias are reported. He is with a diabetic history and most recent hemoglobin A1c being 6.4. Review of Systems  Pertinent items are noted in HPI. All other systems were reviewed and are negative. Physical Exam  /74 (BP Location: Left arm, Patient Position: Sitting, Cuff Size: Standard)   Pulse 89   Ht 5' 9" (1.753 m)   Wt 77.1 kg (170 lb)   BMI 25.10 kg/m²   Cons: Appears well. No apparent distress. Psych: Alert. Oriented x3. Mood and affect normal.  Left thumb is tender to palpation at the [facility] joint with a positive CMC grind test.  Pinch strength and  strength reproduces pain at the [facility] joint however range of motion remains full at the thumb. He has no thenar atrophy or wasting. He has a negative Finkelstein's test.  He is nontender at the distal radius and ulnar styloid. There is no anatomical snuffbox tenderness. He is nontender throughout palpation of the metacarpals. Distal radial and ulnar pulses are +2. He is neurovascular intact in the left upper extremity with normal capillary refill.     X-rays performed in the office today 3 views of the left wrist show no acute fractures or dislocations. CMC joint osteoarthritis and trapeziometacarpal osteoarthritis is noted. X-rays were reviewed from orthopedic standpoint will await official radiologist interpretation           Studies Reviewed  Narrative    This result has an attachment that is not available. 3 views of the left hand reveals no evidence of fracture dislocation. Carpal alignment is normal.  Stage II trapeziometacarpal metacarpal  arthritic changes are noted. Mild narrowing of various interphalangeal  joints is similarly noted. Exam End: 03/30/22 11:48 Last Resulted: 04/06/22 08:59       Medium joint arthrocentesis  Universal Protocol:  Consent: Verbal consent obtained. Risks and benefits: risks, benefits and alternatives were discussed  Consent given by: patient  Time out: Immediately prior to procedure a "time out" was called to verify the correct patient, procedure, equipment, support staff and site/side marked as required. Patient understanding: patient states understanding of the procedure being performed  Patient consent: the patient's understanding of the procedure matches consent given  Relevant documents: relevant documents present and verified  Test results: test results available and properly labeled  Site marked: the operative site was marked  Radiology Images displayed and confirmed. If images not available, report reviewed: imaging studies available  Patient identity confirmed: verbally with patient  Supporting Documentation  Indications: pain   Procedure Details  Location: wrist - Wrist joint: left CMC joint. Needle size: 22 G  Ultrasound guidance: no  Medications administered: 1 mL lidocaine 1 %; 40 mg triamcinolone acetonide 40 mg/mL    Patient tolerance: patient tolerated the procedure well with no immediate complications  Dressing:  Sterile dressing applied    Patient noted significant relief post injection.         Medical, Surgical, Family, and Social History  The patient's medical history, family history, and social history, were reviewed and updated as appropriate. Past Medical History:   Diagnosis Date    Arthritis     left wrist & thumb    Diabetes mellitus ([address])     Hyperlipidemia     Hypertension     Kidney stone        Past Surgical History:   Procedure Laterality Date    APPENDECTOMY      COLONOSCOPY      FRACTURE SURGERY      elbow    HERNIA REPAIR      KIDNEY STONE SURGERY Left     SD COLONOSCOPY FLX DX W/COLLJ SPEC WHEN PFRMD N/A 10/27/2017    Procedure: COLONOSCOPY;  Surgeon: Ernst Suero MD;  [facility]: [facility];   Service: Colorectal       Family History   Problem Relation Age of Onset    Breast cancer Mother          in 26 @ 61 yrs    Diabetes type II Mother     Hypertension Mother          at 61 (1987) from breast cancer    Colon cancer Father          in 5 @ 46 yrs    Hypertension Father          at 46 (1979) from cancer    Diabetes type II Brother     Hypertension Brother         Age 47    Heart disease Brother         2 stents    Diabetes Brother     Hypertension Brother          at 48 (2008) from cancer    Diabetes Brother          from [address]     Hypertension Brother         Age 71    Diabetes Brother     Glaucoma Brother             Hypertension Brother         Age 79    Diabetes Brother     Heart disease Brother         Open heart surgery 2021       Social History     Occupational History    Not on file   Tobacco Use    Smoking status: Never    Smokeless tobacco: Never    Tobacco comments:     Never used it   Vaping Use    Vaping Use: Never used   Substance and Sexual Activity    Alcohol use: No    Drug use: No    Sexual activity: Yes     Partners: Female     Birth control/protection: None       Allergies   Allergen Reactions    Pollen Extract Other (See Comments)         Current Outpatient Medications:     allopurinol (ZYLOPRIM) 300 mg tablet, Take 300 mg by mouth daily As directed, Disp: , Rfl: 3    Armodafinil 50 MG tablet, Take 4 tablets (200 mg total) by mouth daily, Disp: 360 tablet, Rfl: 0    aspirin 325 mg tablet, Take 325 mg by mouth daily, Disp: , Rfl:     atorvastatin (LIPITOR) 10 mg tablet, Take 1 tablet (10 mg total) by mouth daily, Disp: 30 tablet, Rfl: 1    BD PEN NEEDLE DARIUS U/F 32G X 4 MM MISC, USE 2 DAILY, Disp: , Rfl: 3    chlorthalidone 25 mg tablet, Take 25 mg by mouth 2 (two) times a day, Disp: , Rfl:     Cholecalciferol 25 MCG (1000 UT) capsule, Take 1,000 Units by mouth daily, Disp: , Rfl:     Dulaglutide (Trulicity) 1.5 LH/5.3ZC SOPN, Inject 1.5 mg under the skin once a week, Disp: , Rfl:     fenofibrate (TRICOR) 48 mg tablet, Take 1 tablet (48 mg total) by mouth daily, Disp: 30 tablet, Rfl: 5    FREESTYLE LITE test strip, use 1 TEST STRIP to TEST BLOOD SUGAR four times a day, Disp: , Rfl:     Glucagon (Baqsimi One Pack) 3 MG/DOSE POWD, 3 mg, Disp: , Rfl:     HumaLOG KwikPen 100 units/mL injection pen, 15 units in the morning, 17 units at lunch, 26-28 units at dinner, Disp: , Rfl:     Lancets (freestyle) lancets, use 1 LANCET to TEST BLOOD SUGAR four times a day, Disp: , Rfl:     Lantus SoloStar 100 units/mL injection pen, 20 Units daily, Disp: , Rfl:     latanoprost (XALATAN) 0.005 % ophthalmic solution, Administer 1 drop to both eyes daily at bedtime, Disp: , Rfl:     levothyroxine 25 mcg tablet, Take 25 mcg by mouth daily, Disp: , Rfl: 3    metFORMIN (GLUCOPHAGE) 500 mg tablet, Take 500 mg by mouth 2 (two) times a day with meals, Disp: , Rfl:     metoprolol succinate (TOPROL-XL) 25 mg 24 hr tablet, TAKE 1 TABLET BY MOUTH EVERYDAY AT BEDTIME, Disp: 90 tablet, Rfl: 0    potassium citrate (UROCIT-K) 10 mEq, Take 10 mEq by mouth 3 (three) times a day, Disp: , Rfl:     Psyllium (METAMUCIL FIBER PO), Take 1 Units/oz by mouth 1 teaspoon daily, Disp: , Rfl:     telmisartan (MICARDIS) 80 MG tablet, TAKE 1 TABLET BY MOUTH EVERY DAY, Disp: 90 tablet, Rfl: 3    Wheat Dextrin (BENEFIBER DRINK MIX PO), Take 1 unit marking on U-100 syringe by mouth 1 teaspoon daily, Disp: , Rfl:       Garry Almanza PA-C

## 2023-12-01 LAB
CREAT ?TM UR-SCNC: 123.4 UMOL/L
EXT ALBUMIN URINE RANDOM: 1.9
MICROALBUMIN/CREAT UR: 15.4 MG/G{CREAT}

## 2023-12-07 NOTE — TELEPHONE ENCOUNTER
As a follow-up, a second attempt has been made for outreach via fax to facility. Please see Contacts section for details. foot exam     Thank you  Antoine Schaefer MA

## 2023-12-13 NOTE — TELEPHONE ENCOUNTER
As a final attempt, a third outreach has been made via telephone call to facility. Please see Contacts section for details. This encounter will be closed and completed by end of day. Should we receive the requested information because of previous outreach attempts, the requested patient's chart will be updated appropriately.      Thank you  Alfredo Walker MA

## 2023-12-14 ENCOUNTER — VBI (OUTPATIENT)
Dept: ADMINISTRATIVE | Facility: OTHER | Age: 68
End: 2023-12-14

## 2023-12-22 DIAGNOSIS — E11.8 TYPE 2 DIABETES MELLITUS WITH COMPLICATION, WITH LONG-TERM CURRENT USE OF INSULIN (HCC): ICD-10-CM

## 2023-12-22 DIAGNOSIS — Z79.4 TYPE 2 DIABETES MELLITUS WITH COMPLICATION, WITH LONG-TERM CURRENT USE OF INSULIN (HCC): ICD-10-CM

## 2023-12-22 RX ORDER — ATORVASTATIN CALCIUM 10 MG/1
10 TABLET, FILM COATED ORAL DAILY
Qty: 90 TABLET | Refills: 1 | Status: SHIPPED | OUTPATIENT
Start: 2023-12-22

## 2023-12-22 RX ORDER — FENOFIBRATE 48 MG/1
48 TABLET, COATED ORAL DAILY
Qty: 90 TABLET | Refills: 1 | Status: SHIPPED | OUTPATIENT
Start: 2023-12-22

## 2024-02-01 DIAGNOSIS — I10 BENIGN ESSENTIAL HYPERTENSION: ICD-10-CM

## 2024-02-01 RX ORDER — TELMISARTAN 80 MG/1
TABLET ORAL
Qty: 90 TABLET | Refills: 1 | Status: SHIPPED | OUTPATIENT
Start: 2024-02-01

## 2024-02-06 DIAGNOSIS — I25.10 CORONARY ARTERY DISEASE INVOLVING NATIVE CORONARY ARTERY OF NATIVE HEART WITHOUT ANGINA PECTORIS: ICD-10-CM

## 2024-02-06 DIAGNOSIS — I10 BENIGN ESSENTIAL HYPERTENSION: ICD-10-CM

## 2024-02-06 RX ORDER — METOPROLOL SUCCINATE 25 MG/1
25 TABLET, EXTENDED RELEASE ORAL
Qty: 90 TABLET | Refills: 1 | Status: SHIPPED | OUTPATIENT
Start: 2024-02-06

## 2024-02-06 NOTE — TELEPHONE ENCOUNTER
----- Message from Juan Medley sent at 2/5/2024  3:41 PM EST -----  Regarding: Metoprolol Succinate ER, 25 mg tablet  Contact: [phone]  Evelio Vang,  Please provide a 90-day script renewal to the [facility], [facility] [place], [phone] for 90 tablets of the subject medication. Although Tex Baldwin is listed as the Prescriber it probable makes more sense for you, as my PC, to renew the script.   Thank you,  Ranjit Medley

## 2024-02-07 DIAGNOSIS — Z72.820 SLEEP DEPRIVATION: ICD-10-CM

## 2024-02-07 RX ORDER — ARMODAFINIL 50 MG/1
200 TABLET ORAL DAILY
Qty: 360 TABLET | Refills: 0 | Status: SHIPPED | OUTPATIENT
Start: 2024-02-07 | End: 2024-05-07

## 2024-02-09 DIAGNOSIS — Z72.820 SLEEP DEPRIVATION: ICD-10-CM

## 2024-02-09 NOTE — TELEPHONE ENCOUNTER
"Patient sent my chart message for prior auth    \"Evelio Barth,  I just checked my [facility] portal (at 7:00P, 2/8/24) and the note posted next to Armodafinil reads  \"Your insurance requires approval, We've reached out to your prescriber for authorization.\"  I know that the Doctor sent the prescription to the pharmacy but apparently there is yet another step that is required by the insurer, [employer] PDP.  Please check.  Thank you,  Ranjit\"     "

## 2024-02-09 NOTE — TELEPHONE ENCOUNTER
Reason for call: NOT A DUPLICATE  [] Refill   [] Prior Auth  [x] Other:   [facility]. Patient have been back and forth with the insurance and the pharm, he is going to use [facility] at [facility] where it is cheaper    Patient is currently out of medication and have been out of meds since 02.07.2024     Please resubmitted ASAP, he  would appreciate if this can be sent today!    Office:   [x] PCP/Provider -   [] Specialty/Provider -     Medication: armodafinil    Dose/Frequency: 50 mg / 4 tabs daily    Quantity: 90D    Pharmacy: [facility] [address]    Does the patient have enough for 3 days?   [] Yes   [x] No - Send as HP to POD

## 2024-02-13 ENCOUNTER — TELEPHONE (OUTPATIENT)
Age: 69
End: 2024-02-13

## 2024-02-13 RX ORDER — ARMODAFINIL 50 MG/1
200 TABLET ORAL DAILY
Qty: 360 TABLET | Refills: 0 | OUTPATIENT
Start: 2024-02-13 | End: 2024-05-13

## 2024-02-13 NOTE — TELEPHONE ENCOUNTER
"Patient sent [employer] message regarding Prior Auth    \"Hello,  I just checked my [facility] portal again today (at 12:34P, 2/13/24) and the note posted next to the Armodafinil script reads  \"Your insurance requires approval, We've reached out to your prescriber for authorization.\"  Could you please check to determine if this step with my Medicare D insurer has been completed by Dr. Vang? It is now 6 days from my last dose.  Thank you,  Ranjit\"  "

## 2024-02-13 NOTE — TELEPHONE ENCOUNTER
PA for Armodafinil 50 mg tablet    Submitted via  [x]CMM-KEY: [id number]  [][employer]-Case ID #   []Faxed to plan   []Other website   []Phone call Case ID #     Office notes sent, clinical questions answered. Awaiting determination

## 2024-02-13 NOTE — TELEPHONE ENCOUNTER
PA for Armodafinil 50 mg tablet Approved     Date(s) approved 1-1-2024 to 2-        Patient advised by [x] [employer] Message                      [] Phone call       Pharmacy advised by [x]Fax                                     []Phone call    Approval letter scanned into [employer] Yes

## 2024-03-12 DIAGNOSIS — Z72.820 SLEEP DEPRIVATION: ICD-10-CM

## 2024-03-12 RX ORDER — ARMODAFINIL 50 MG/1
200 TABLET ORAL DAILY
Qty: 360 TABLET | Refills: 0 | Status: SHIPPED | OUTPATIENT
Start: 2024-03-12 | End: 2024-03-18 | Stop reason: SDUPTHER

## 2024-03-18 DIAGNOSIS — Z72.820 SLEEP DEPRIVATION: ICD-10-CM

## 2024-03-18 RX ORDER — ARMODAFINIL 50 MG/1
200 TABLET ORAL DAILY
Qty: 360 TABLET | Refills: 0 | Status: SHIPPED | OUTPATIENT
Start: 2024-03-18 | End: 2024-03-20

## 2024-03-20 DIAGNOSIS — Z72.820 SLEEP DEPRIVATION: ICD-10-CM

## 2024-03-20 RX ORDER — ARMODAFINIL 200 MG/1
200 TABLET ORAL DAILY
Qty: 90 TABLET | Refills: 0 | Status: SHIPPED | OUTPATIENT
Start: 2024-03-20 | End: 2024-06-18

## 2024-05-28 ENCOUNTER — HOSPITAL ENCOUNTER (OUTPATIENT)
Dept: HOSPITAL 99 - SDS | Age: 69
Discharge: HOME | End: 2024-05-28
Payer: MEDICARE

## 2024-05-28 VITALS — DIASTOLIC BLOOD PRESSURE: 71 MMHG | SYSTOLIC BLOOD PRESSURE: 113 MMHG | OXYGEN SATURATION: 2 %

## 2024-05-28 VITALS — DIASTOLIC BLOOD PRESSURE: 70 MMHG

## 2024-05-28 VITALS — RESPIRATION RATE: 18 BRPM | DIASTOLIC BLOOD PRESSURE: 72 MMHG | OXYGEN SATURATION: 2 %

## 2024-05-28 VITALS — DIASTOLIC BLOOD PRESSURE: 70 MMHG | RESPIRATION RATE: 16 BRPM

## 2024-05-28 VITALS — DIASTOLIC BLOOD PRESSURE: 68 MMHG | SYSTOLIC BLOOD PRESSURE: 113 MMHG | RESPIRATION RATE: 21 BRPM

## 2024-05-28 VITALS — SYSTOLIC BLOOD PRESSURE: 113 MMHG | DIASTOLIC BLOOD PRESSURE: 71 MMHG | RESPIRATION RATE: 16 BRPM

## 2024-05-28 VITALS — DIASTOLIC BLOOD PRESSURE: 66 MMHG | RESPIRATION RATE: 15 BRPM | SYSTOLIC BLOOD PRESSURE: 113 MMHG

## 2024-05-28 VITALS
RESPIRATION RATE: 12 BRPM | DIASTOLIC BLOOD PRESSURE: 68 MMHG | OXYGEN SATURATION: 2 % | SYSTOLIC BLOOD PRESSURE: 117 MMHG

## 2024-05-28 VITALS — DIASTOLIC BLOOD PRESSURE: 63 MMHG | SYSTOLIC BLOOD PRESSURE: 90 MMHG

## 2024-05-28 VITALS — BODY MASS INDEX: 23.9 KG/M2

## 2024-05-28 VITALS — OXYGEN SATURATION: 2 %

## 2024-05-28 DIAGNOSIS — M18.12: Primary | ICD-10-CM

## 2024-05-28 LAB
GLUCOSE - POINT OF CARE: 92 MG/DL (ref 70–99)
GLUCOSE - POINT OF CARE: 92 MG/DL (ref 70–99)
GLUCOSE - POINT OF CARE: 94 MG/DL (ref 70–99)

## 2024-05-28 PROCEDURE — 26480 TRANSPLANT HAND TENDON: CPT

## 2024-05-28 PROCEDURE — 25447 ARTHRP NTRCRP/CRP/MTCR NTRPS: CPT

## 2024-05-28 PROCEDURE — C1776 JOINT DEVICE (IMPLANTABLE): HCPCS

## 2024-05-28 RX ADMIN — ACETAMINOPHEN 1000 MG: 500 TABLET ORAL at 07:15

## 2024-05-28 RX ADMIN — SODIUM CHLORIDE, SODIUM ACETATE ANHYDROUS, SODIUM GLUCONATE, POTASSIUM CHLORIDE, AND MAGNESIUM CHLORIDE 1000: 526; 222; 502; 37; 30 INJECTION, SOLUTION INTRAVENOUS at 07:15

## 2024-05-28 RX ADMIN — CELECOXIB 200 MG: 200 CAPSULE ORAL at 07:15

## 2024-05-30 DIAGNOSIS — Z72.820 SLEEP DEPRIVATION: ICD-10-CM

## 2024-05-30 RX ORDER — ARMODAFINIL 200 MG/1
200 TABLET ORAL DAILY
Qty: 90 TABLET | Refills: 0 | Status: SHIPPED | OUTPATIENT
Start: 2024-05-30 | End: 2024-08-28

## 2024-06-05 ENCOUNTER — OFFICE VISIT (OUTPATIENT)
Dept: FAMILY MEDICINE CLINIC | Facility: CLINIC | Age: 69
End: 2024-06-05
Payer: MEDICARE

## 2024-06-05 VITALS
DIASTOLIC BLOOD PRESSURE: 66 MMHG | BODY MASS INDEX: 24.73 KG/M2 | RESPIRATION RATE: 18 BRPM | HEIGHT: 69 IN | TEMPERATURE: 97.8 F | WEIGHT: 167 LBS | OXYGEN SATURATION: 98 % | HEART RATE: 82 BPM | SYSTOLIC BLOOD PRESSURE: 112 MMHG

## 2024-06-05 DIAGNOSIS — Z72.820 SLEEP DEPRIVATION: ICD-10-CM

## 2024-06-05 DIAGNOSIS — E78.2 MIXED HYPERLIPIDEMIA: ICD-10-CM

## 2024-06-05 DIAGNOSIS — E11.8 TYPE 2 DIABETES MELLITUS WITH COMPLICATION, WITH LONG-TERM CURRENT USE OF INSULIN (HCC): ICD-10-CM

## 2024-06-05 DIAGNOSIS — D35.2 PITUITARY MICROADENOMA WITH HYPERPROLACTINEMIA (HCC): ICD-10-CM

## 2024-06-05 DIAGNOSIS — E11.59 HYPERTENSION ASSOCIATED WITH TYPE 2 DIABETES MELLITUS  (HCC): Primary | ICD-10-CM

## 2024-06-05 DIAGNOSIS — M19.049 CMC ARTHRITIS: ICD-10-CM

## 2024-06-05 DIAGNOSIS — E22.9 PITUITARY MICROADENOMA WITH HYPERPROLACTINEMIA (HCC): ICD-10-CM

## 2024-06-05 DIAGNOSIS — Z79.4 TYPE 2 DIABETES MELLITUS WITH COMPLICATION, WITH LONG-TERM CURRENT USE OF INSULIN (HCC): ICD-10-CM

## 2024-06-05 DIAGNOSIS — I15.2 HYPERTENSION ASSOCIATED WITH TYPE 2 DIABETES MELLITUS  (HCC): Primary | ICD-10-CM

## 2024-06-05 PROCEDURE — G2211 COMPLEX E/M VISIT ADD ON: HCPCS | Performed by: FAMILY MEDICINE

## 2024-06-05 PROCEDURE — 99214 OFFICE O/P EST MOD 30 MIN: CPT | Performed by: FAMILY MEDICINE

## 2024-06-05 RX ORDER — OXYCODONE HYDROCHLORIDE 5 MG/1
5 TABLET ORAL EVERY 6 HOURS PRN
Qty: 20 TABLET | Refills: 0 | Status: SHIPPED | OUTPATIENT
Start: 2024-06-05

## 2024-06-05 RX ORDER — INSULIN ASPART 100 [IU]/ML
INJECTION, SOLUTION INTRAVENOUS; SUBCUTANEOUS
COMMUNITY
Start: 2024-03-22

## 2024-06-05 RX ORDER — OXYCODONE HYDROCHLORIDE 5 MG/1
TABLET ORAL
COMMUNITY
Start: 2024-05-28 | End: 2024-06-05 | Stop reason: SDUPTHER

## 2024-06-05 NOTE — ASSESSMENT & PLAN NOTE
Lab Results   Component Value Date    HGBA1C 5.9 (H) 05/08/2024     BP Readings from Last 3 Encounters:   06/05/24 112/66   11/30/23 116/74   11/27/23 128/78     Blood pressure remains well-controlled.  Continue metoprolol 25 mg, telmisartan 80 mg and chlorthalidone 25 mg daily.  Lab work reviewed

## 2024-06-05 NOTE — PROGRESS NOTES
Ambulatory Visit  Name: Juan Medley      : 1955      MRN: [id number]  Encounter Provider: Javid Vang MD  Encounter Date: 2024   Encounter department: [facility]    Assessment & Plan   1. Hypertension associated with type 2 diabetes mellitus  (HCC)  Assessment & Plan:    Lab Results   Component Value Date    HGBA1C 5.9 (H) 2024     BP Readings from Last 3 Encounters:   24 112/66   23 116/74   23 128/78     Blood pressure remains well-controlled.  Continue metoprolol 25 mg, telmisartan 80 mg and chlorthalidone 25 mg daily.  Lab work reviewed  2. Type 2 diabetes mellitus with complication, with long-term current use of insulin (HCC)  Assessment & Plan:    Lab Results   Component Value Date    HGBA1C 5.9 (H) 2024   A1c currently well-controlled  Continue current regimen Trulicity 1.5 weekly, metformin 500 mg twice daily, Lantus 20 units, lispro 3 times daily  Continue following with endocrinology [facility]  Orders:  -     Lipid panel; Future  3. Pituitary microadenoma with hyperprolactinemia (HCC)  Assessment & Plan:  Follows with endocrine  4. Sleep deprivation  Assessment & Plan:  Due to work schedule with clients in [place] and [place]  Continue armodafinil 200 mg daily   Patient needs to be seen every 6 months  5. CMC arthritis  Assessment & Plan:  Status post CMC joint replacement by Dr. Zeeshan Hu [facility] ortho on    Incision healing well.  No discharge from incision.  Will prescribe patient another 5 days of oxycodone.  Counseled patient on starting Tylenol 1000 mg 3 times daily scheduled for pain control.  Follow-up with Ortho    Orders:  -     oxyCODONE (ROXICODONE) 5 immediate release tablet; Take 1 tablet (5 mg total) by mouth every 6 (six) hours as needed for moderate pain Max Daily Amount: 20 mg  6. Mixed hyperlipidemia  Assessment & Plan:  Stable.  Repeat lipid panel.  Continue statin and fenofibrate  Follow-up 6  months       History of Present Illness     Patient presents today for 6-month follow-up.  Recently completed blood work.  A1c remains stable.  Kidney function did increase slightly but appears to be at his baseline.  Follows with [facility] endocrinology.  Status post CMC joint replacement in [facility] on 5/28.  Currently taking Tylenol every other day.  Advised patient to do scheduled Tylenol 1000 mg 3 times daily for better pain management.  Will provide patient with a one-time refill of oxycodone 5 mg 20 tablets         Review of Systems   Constitutional:  Negative for activity change, fatigue and fever.   Eyes:  Negative for visual disturbance.   Respiratory:  Negative for shortness of breath.    Cardiovascular:  Negative for chest pain.   Gastrointestinal:  Negative for abdominal pain, constipation, diarrhea and nausea.   Endocrine: Negative for cold intolerance and heat intolerance.   Musculoskeletal:  Negative for back pain.        Left thumb pain s/p CMC joint replacement    Skin:  Negative for rash.   Neurological:  Negative for headaches.   Psychiatric/Behavioral:  Negative for confusion.      Medical History Reviewed by provider this encounter:  Tobacco  Allergies  Meds  Problems  Med Hx  Surg Hx  Fam Hx       Current Outpatient Medications on File Prior to Visit   Medication Sig Dispense Refill    allopurinol (ZYLOPRIM) 300 mg tablet Take 300 mg by mouth daily As directed  3    Armodafinil 200 MG TABS Take 1 tablet (200 mg total) by mouth in the morning 90 tablet 0    aspirin 325 mg tablet Take 325 mg by mouth daily      atorvastatin (LIPITOR) 10 mg tablet TAKE 1 TABLET BY MOUTH EVERY DAY 90 tablet 1    BD PEN NEEDLE DARIUS U/F 32G X 4 MM MISC USE 2 DAILY  3    chlorthalidone 25 mg tablet Take 25 mg by mouth 2 (two) times a day      Cholecalciferol 25 MCG (1000 UT) capsule Take 1,000 Units by mouth daily      Dulaglutide (Trulicity) 1.5 MG/0.5ML SOPN Inject 1.5 mg under the skin once a week       fenofibrate (TRICOR) 48 mg tablet TAKE 1 TABLET BY MOUTH EVERY DAY 90 tablet 1    FREESTYLE LITE test strip use 1 TEST STRIP to TEST BLOOD SUGAR four times a day      Glucagon (Baqsimi One Pack) 3 MG/DOSE POWD 3 mg      HumaLOG KwikPen 100 units/mL injection pen 15 units in the morning, 17 units at lunch, 26-28 units at dinner      Lancets (freestyle) lancets use 1 LANCET to TEST BLOOD SUGAR four times a day      Lantus SoloStar 100 units/mL injection pen 20 Units daily      latanoprost (XALATAN) 0.005 % ophthalmic solution Administer 1 drop to both eyes daily at bedtime      levothyroxine 25 mcg tablet Take 25 mcg by mouth daily  3    metFORMIN (GLUCOPHAGE) 500 mg tablet Take 500 mg by mouth 2 (two) times a day with meals      metoprolol succinate (TOPROL-XL) 25 mg 24 hr tablet Take 1 tablet (25 mg total) by mouth daily at bedtime 90 tablet 1    NovoLOG FlexPen 100 units/mL injection pen PLEASE SEE ATTACHED FOR DETAILED DIRECTIONS      potassium citrate (UROCIT-K) 10 mEq Take 10 mEq by mouth 3 (three) times a day      Psyllium (METAMUCIL FIBER PO) Take 1 Units/oz by mouth 1 teaspoon daily      telmisartan (MICARDIS) 80 MG tablet TAKE 1 TABLET BY MOUTH EVERY DAY 90 tablet 1    Wheat Dextrin (BENEFIBER DRINK MIX PO) Take 1 unit marking on U-100 syringe by mouth 1 teaspoon daily      [DISCONTINUED] oxyCODONE (ROXICODONE) 5 immediate release tablet TAKE 1 TABLET BY MOUTH EVERY 6 HOURS AS NEEDED FOR 5 DAYS       No current facility-administered medications on file prior to visit.      Social History     Tobacco Use    Smoking status: Never    Smokeless tobacco: Never    Tobacco comments:     Never used it   Vaping Use    Vaping status: Never Used   Substance and Sexual Activity    Alcohol use: No    Drug use: No    Sexual activity: Yes     Partners: Female     Birth control/protection: None     Objective     /66 (BP Location: Left arm, Patient Position: Sitting, Cuff Size: Large)   Pulse 82   Temp 97.8 °F  "(36.6 °C) (Temporal)   Resp 18   Ht 5' 9\" (1.753 m)   Wt 75.8 kg (167 lb)   SpO2 98%   BMI 24.66 kg/m²     Physical Exam  Vitals and nursing note reviewed.   Constitutional:       Appearance: Normal appearance. He is well-developed.   HENT:      Head: Normocephalic and atraumatic.   Eyes:      Extraocular Movements: Extraocular movements intact.      Pupils: Pupils are equal, round, and reactive to light.   Cardiovascular:      Rate and Rhythm: Normal rate and regular rhythm.   Pulmonary:      Effort: Pulmonary effort is normal.      Breath sounds: Normal breath sounds.   Abdominal:      General: Bowel sounds are normal.      Palpations: Abdomen is soft.   Musculoskeletal:         General: Tenderness present.      Right hand: Tenderness present. Decreased range of motion.      Cervical back: Normal range of motion.      Comments: Sutures present. No discharge or erythema    Skin:     General: Skin is warm and dry.   Neurological:      General: No focal deficit present.      Mental Status: He is alert and oriented to person, place, and time.   Psychiatric:         Mood and Affect: Mood normal.         Speech: Speech normal.         Behavior: Behavior normal.       Administrative Statements     "

## 2024-06-05 NOTE — ASSESSMENT & PLAN NOTE
Lab Results   Component Value Date    HGBA1C 5.9 (H) 05/08/2024   A1c currently well-controlled  Continue current regimen Trulicity 1.5 weekly, metformin 500 mg twice daily, Lantus 20 units, lispro 3 times daily  Continue following with endocrinology [facility]

## 2024-06-05 NOTE — ASSESSMENT & PLAN NOTE
Due to work schedule with clients in [place] and [place]  Continue armodafinil 200 mg daily   Patient needs to be seen every 6 months

## 2024-06-05 NOTE — ASSESSMENT & PLAN NOTE
Status post CMC joint replacement by Dr. Zeeshan Hu [facility] ortho on 5/28   Incision healing well.  No discharge from incision.  Will prescribe patient another 5 days of oxycodone.  Counseled patient on starting Tylenol 1000 mg 3 times daily scheduled for pain control.  Follow-up with Ortho

## 2024-06-08 LAB
CHOLEST SERPL-MCNC: 190 MG/DL
CHOLEST/HDLC SERPL: 4.9 {RATIO}
HDLC SERPL-MCNC: 39 MG/DL (ref 23–92)
LDLC SERPL CALC-MCNC: 127 MG/DL
NONHDLC SERPL-MCNC: 151 MG/DL
TRIGL SERPL-MCNC: 120 MG/DL

## 2024-06-16 DIAGNOSIS — Z79.4 TYPE 2 DIABETES MELLITUS WITH COMPLICATION, WITH LONG-TERM CURRENT USE OF INSULIN (HCC): ICD-10-CM

## 2024-06-16 DIAGNOSIS — E11.8 TYPE 2 DIABETES MELLITUS WITH COMPLICATION, WITH LONG-TERM CURRENT USE OF INSULIN (HCC): ICD-10-CM

## 2024-06-16 RX ORDER — ATORVASTATIN CALCIUM 10 MG/1
10 TABLET, FILM COATED ORAL DAILY
Qty: 90 TABLET | Refills: 1 | Status: SHIPPED | OUTPATIENT
Start: 2024-06-16

## 2024-06-16 RX ORDER — FENOFIBRATE 48 MG/1
48 TABLET, COATED ORAL DAILY
Qty: 90 TABLET | Refills: 1 | Status: SHIPPED | OUTPATIENT
Start: 2024-06-16

## 2024-08-05 DIAGNOSIS — I10 BENIGN ESSENTIAL HYPERTENSION: ICD-10-CM

## 2024-08-05 DIAGNOSIS — I25.10 CORONARY ARTERY DISEASE INVOLVING NATIVE CORONARY ARTERY OF NATIVE HEART WITHOUT ANGINA PECTORIS: ICD-10-CM

## 2024-08-05 RX ORDER — TELMISARTAN 80 MG/1
TABLET ORAL
Qty: 90 TABLET | Refills: 1 | Status: SHIPPED | OUTPATIENT
Start: 2024-08-05

## 2024-08-05 RX ORDER — METOPROLOL SUCCINATE 25 MG/1
25 TABLET, EXTENDED RELEASE ORAL
Qty: 90 TABLET | Refills: 1 | Status: SHIPPED | OUTPATIENT
Start: 2024-08-05

## 2024-09-11 DIAGNOSIS — Z72.820 SLEEP DEPRIVATION: ICD-10-CM

## 2024-09-12 RX ORDER — ARMODAFINIL 200 MG/1
200 TABLET ORAL DAILY
Qty: 90 TABLET | Refills: 0 | Status: SHIPPED | OUTPATIENT
Start: 2024-09-12 | End: 2024-12-11

## 2024-11-21 DIAGNOSIS — Z72.820 SLEEP DEPRIVATION: ICD-10-CM

## 2024-11-21 NOTE — TELEPHONE ENCOUNTER
Message sent via [employer].   Evelio Vang,  My Armodafinil prescription is coming up for renewal, last 90 script filled on Sep. 16, 2024 (Last tablet will be Dec. 14).  Please send refill script to [facility], [address] [place] [phone] ([phone]).  [facility] does not keep this med in stock and has had to order it every time.  Thank you,  Ranjit Medley

## 2024-11-22 RX ORDER — ARMODAFINIL 200 MG/1
200 TABLET ORAL DAILY
Qty: 90 TABLET | Refills: 0 | Status: SHIPPED | OUTPATIENT
Start: 2024-11-22 | End: 2025-02-20

## 2024-12-05 ENCOUNTER — OFFICE VISIT (OUTPATIENT)
Dept: FAMILY MEDICINE CLINIC | Facility: CLINIC | Age: 69
End: 2024-12-05
Payer: MEDICARE

## 2024-12-05 VITALS
HEIGHT: 69 IN | OXYGEN SATURATION: 99 % | SYSTOLIC BLOOD PRESSURE: 122 MMHG | BODY MASS INDEX: 24.59 KG/M2 | TEMPERATURE: 97.7 F | HEART RATE: 72 BPM | DIASTOLIC BLOOD PRESSURE: 60 MMHG | RESPIRATION RATE: 20 BRPM | WEIGHT: 166 LBS

## 2024-12-05 DIAGNOSIS — I15.2 HYPERTENSION ASSOCIATED WITH TYPE 2 DIABETES MELLITUS  (HCC): Primary | ICD-10-CM

## 2024-12-05 DIAGNOSIS — E83.52 HYPERCALCEMIA: ICD-10-CM

## 2024-12-05 DIAGNOSIS — E11.8 TYPE 2 DIABETES MELLITUS WITH COMPLICATION, WITH LONG-TERM CURRENT USE OF INSULIN (HCC): ICD-10-CM

## 2024-12-05 DIAGNOSIS — Z00.00 MEDICARE ANNUAL WELLNESS VISIT, SUBSEQUENT: ICD-10-CM

## 2024-12-05 DIAGNOSIS — E03.9 ACQUIRED HYPOTHYROIDISM: ICD-10-CM

## 2024-12-05 DIAGNOSIS — M19.049 CMC ARTHRITIS: ICD-10-CM

## 2024-12-05 DIAGNOSIS — Z72.820 SLEEP DEPRIVATION: ICD-10-CM

## 2024-12-05 DIAGNOSIS — Z79.4 TYPE 2 DIABETES MELLITUS WITH COMPLICATION, WITH LONG-TERM CURRENT USE OF INSULIN (HCC): ICD-10-CM

## 2024-12-05 DIAGNOSIS — E11.59 HYPERTENSION ASSOCIATED WITH TYPE 2 DIABETES MELLITUS  (HCC): Primary | ICD-10-CM

## 2024-12-05 DIAGNOSIS — I25.10 CORONARY ARTERY DISEASE INVOLVING NATIVE CORONARY ARTERY OF NATIVE HEART WITHOUT ANGINA PECTORIS: ICD-10-CM

## 2024-12-05 DIAGNOSIS — E78.2 MIXED HYPERLIPIDEMIA: ICD-10-CM

## 2024-12-05 PROBLEM — E55.9 VITAMIN D DEFICIENCY: Status: RESOLVED | Noted: 2019-04-25 | Resolved: 2024-12-05

## 2024-12-05 PROCEDURE — G0439 PPPS, SUBSEQ VISIT: HCPCS | Performed by: FAMILY MEDICINE

## 2024-12-05 PROCEDURE — 99214 OFFICE O/P EST MOD 30 MIN: CPT | Performed by: FAMILY MEDICINE

## 2024-12-05 PROCEDURE — G0444 DEPRESSION SCREEN ANNUAL: HCPCS | Performed by: FAMILY MEDICINE

## 2024-12-05 NOTE — PATIENT INSTRUCTIONS
Medicare Preventive Visit Patient Instructions  Thank you for completing your Welcome to Medicare Visit or Medicare Annual Wellness Visit today. Your next wellness visit will be due in one year (12/6/2025).  The screening/preventive services that you may require over the next 5-10 years are detailed below. Some tests may not apply to you based off risk factors and/or age. Screening tests ordered at today's visit but not completed yet may show as past due. Also, please note that scanned in results may not display below.  Preventive Screenings:  Service Recommendations Previous Testing/Comments   Colorectal Cancer Screening  Colonoscopy    Fecal Occult Blood Test (FOBT)/Fecal Immunochemical Test (FIT)  Fecal DNA/Cologuard Test  Flexible Sigmoidoscopy Age: 45-75 years old   Colonoscopy: every 10 years (May be performed more frequently if at higher risk)  OR  FOBT/FIT: every 1 year  OR  Cologuard: every 3 years  OR  Sigmoidoscopy: every 5 years  Screening may be recommended earlier than age 45 if at higher risk for colorectal cancer. Also, an individualized decision between you and your healthcare provider will decide whether screening between the ages of 76-85 would be appropriate. Colonoscopy: 01/25/2023  FOBT/FIT: Not on file  Cologuard: Not on file  Sigmoidoscopy: Not on file    Screening Current     Prostate Cancer Screening Individualized decision between patient and health care provider in men between ages of 55-69   Medicare will cover every 12 months beginning on the day after your 50th birthday PSA: No results in last 5 years           Hepatitis C Screening Once for adults born between 1945 and 1965  More frequently in patients at high risk for Hepatitis C Hep C Antibody: 06/12/2017    Screening Current   Diabetes Screening 1-2 times per year if you're at risk for diabetes or have pre-diabetes Fasting glucose: No results in last 5 years (No results in last 5 years)  A1C: 5.9 % (11/22/2024)  History  Diabetes  Screening Current   Cholesterol Screening Once every 5 years if you don't have a lipid disorder. May order more often based on risk factors. Lipid panel: 06/08/2024  History Lipid Disorder  Screening Current      Other Preventive Screenings Covered by Medicare:  Abdominal Aortic Aneurysm (AAA) Screening: covered once if your at risk. You're considered to be at risk if you have a family history of AAA or a male between the age of 65-75 who smoking at least 100 cigarettes in your lifetime.  Lung Cancer Screening: covers low dose CT scan once per year if you meet all of the following conditions: (1) Age 55-77; (2) No signs or symptoms of lung cancer; (3) Current smoker or have quit smoking within the last 15 years; (4) You have a tobacco smoking history of at least 20 pack years (packs per day x number of years you smoked); (5) You get a written order from a healthcare provider.  Glaucoma Screening: covered annually if you're considered high risk: (1) You have diabetes OR (2) Family history of glaucoma OR (3)  aged 50 and older OR (4)  American aged 65 and older  Osteoporosis Screening: covered every 2 years if you meet one of the following conditions: (1) Have a vertebral abnormality; (2) On glucocorticoid therapy for more than 3 months; (3) Have primary hyperparathyroidism; (4) On osteoporosis medications and need to assess response to drug therapy.  HIV Screening: covered annually if you're between the age of 15-65. Also covered annually if you are younger than 15 and older than 65 with risk factors for HIV infection. For pregnant patients, it is covered up to 3 times per pregnancy.    Immunizations:  Immunization Recommendations   Influenza Vaccine Annual influenza vaccination during flu season is recommended for all persons aged >= 6 months who do not have contraindications   Pneumococcal Vaccine   * Pneumococcal conjugate vaccine = PCV13 (Prevnar 13), PCV15 (Vaxneuvance), PCV20  (Prevnar 20)  * Pneumococcal polysaccharide vaccine = PPSV23 (Pneumovax) Adults 19-65 yo with certain risk factors or if 65+ yo  If never received any pneumonia vaccine: recommend Prevnar 20 (PCV20)  Give PCV20 if previously received 1 dose of PCV13 or PPSV23   Hepatitis B Vaccine 3 dose series if at intermediate or high risk (ex: diabetes, end stage renal disease, liver disease)   Respiratory syncytial virus (RSV) Vaccine - COVERED BY MEDICARE PART D  * RSVPreF3 (Arexvy) CDC recommends that adults 60 years of age and older may receive a single dose of RSV vaccine using shared clinical decision-making (SCDM)   Tetanus (Td) Vaccine - COST NOT COVERED BY MEDICARE PART B Following completion of primary series, a booster dose should be given every 10 years to maintain immunity against tetanus. Td may also be given as tetanus wound prophylaxis.   Tdap Vaccine - COST NOT COVERED BY MEDICARE PART B Recommended at least once for all adults. For pregnant patients, recommended with each pregnancy.   Shingles Vaccine (Shingrix) - COST NOT COVERED BY MEDICARE PART B  2 shot series recommended in those 19 years and older who have or will have weakened immune systems or those 50 years and older     Health Maintenance Due:      Topic Date Due   • Colorectal Cancer Screening  01/24/2028   • Hepatitis C Screening  Completed     Immunizations Due:      Topic Date Due   • COVID-19 Vaccine (2 - 2024-25 season) 09/01/2024     Advance Directives   What are advance directives?  Advance directives are legal documents that state your wishes and plans for medical care. These plans are made ahead of time in case you lose your ability to make decisions for yourself. Advance directives can apply to any medical decision, such as the treatments you want, and if you want to donate organs.   What are the types of advance directives?  There are many types of advance directives, and each state has rules about how to use them. You may choose a  combination of any of the following:  Living will:  This is a written record of the treatment you want. You can also choose which treatments you do not want, which to limit, and which to stop at a certain time. This includes surgery, medicine, IV fluid, and tube feedings.   Durable power of  for healthcare (DPAHC):  This is a written record that states who you want to make healthcare choices for you when you are unable to make them for yourself. This person, called a proxy, is usually a family member or a friend. You may choose more than 1 proxy.  Do not resuscitate (DNR) order:  A DNR order is used in case your heart stops beating or you stop breathing. It is a request not to have certain forms of treatment, such as CPR. A DNR order may be included in other types of advance directives.  Medical directive:  This covers the care that you want if you are in a coma, near death, or unable to make decisions for yourself. You can list the treatments you want for each condition. Treatment may include pain medicine, surgery, blood transfusions, dialysis, IV or tube feedings, and a ventilator (breathing machine).  Values history:  This document has questions about your views, beliefs, and how you feel and think about life. This information can help others choose the care that you would choose.  Why are advance directives important?  An advance directive helps you control your care. Although spoken wishes may be used, it is better to have your wishes written down. Spoken wishes can be misunderstood, or not followed. Treatments may be given even if you do not want them. An advance directive may make it easier for your family to make difficult choices about your care.   Narcotic (Opioid) Safety    Use narcotics safely:  Take prescribed narcotics exactly as directed  Do not give narcotics to others or take narcotics that belong to someone else  Do not mix narcotics without medicines or alcohol  Do not drive or operate  heavy machinery after you take the narcotic  Monitor for side effects and notify your healthcare provider if you experienced side effects such as nausea, sleepiness, itching, or trouble thinking clearly.    Manage constipation:    Constipation is the most common side effect of narcotic medicine. Constipation is when you have hard, dry bowel movements, or you go longer than usual between bowel movements. Tell your healthcare provider about all changes in your bowel movements while you are taking narcotics. He or she may recommend laxative medicine to help you have a bowel movement. He or she may also change the kind of narcotic you are taking, or change when you take it. The following are more ways you can prevent or relieve constipation:    Drink liquids as directed.  You may need to drink extra liquids to help soften and move your bowels. Ask how much liquid to drink each day and which liquids are best for you.  Eat high-fiber foods.  This may help decrease constipation by adding bulk to your bowel movements. High-fiber foods include fruits, vegetables, whole-grain breads and cereals, and beans. Your healthcare provider or dietitian can help you create a high-fiber meal plan. Your provider may also recommend a fiber supplement if you cannot get enough fiber from food.  Exercise regularly.  Regular physical activity can help stimulate your intestines. Walking is a good exercise to prevent or relieve constipation. Ask which exercises are best for you.  Schedule a time each day to have a bowel movement.  This may help train your body to have regular bowel movements. Bend forward while you are on the toilet to help move the bowel movement out. Sit on the toilet for at least 10 minutes, even if you do not have a bowel movement.    Store narcotics safely:   Store narcotics where others cannot easily get them.  Keep them in a locked cabinet or secure area. Do not  keep them in a purse or other bag you carry with you. A  person may be looking for something else and find the narcotics.  Make sure narcotics are stored out of the reach of children.  A child can easily overdose on narcotics. Narcotics may look like candy to a small child.    The best way to dispose of narcotics:      The laws vary by country and area. In the [place], the best way is to return the narcotics through a take-back program. This program is offered by the [place] Drug Enforcement Agency (SAMIRA). The following are options for using the program:  Take the narcotics to a SAMIRA collection site.  The site is often a law enforcement center. Call your local law enforcement center for scheduled take-back days in your area. You will be given information on where to go if the collection site is in a different location.  Take the narcotics to an approved pharmacy or hospital.  A pharmacy or hospital may be set up as a collection site. You will need to ask if it is a SAMIRA collection site if you were not directed there. A pharmacy or doctor's office may not be able to take back narcotics unless it is a SAMIRA site.  Use a mail-back system.  This means you are given containers to put the narcotics into. You will then mail them in the containers.  Use a take-back drop box.  This is a place to leave the narcotics at any time. People and animals will not be able to get into the box. Your local law enforcement agency can tell you where to find a drop box in your area.    Other ways to manage pain:   Ask your healthcare provider about non-narcotic medicines to control pain.  Nonprescription medicines include NSAIDs (such as ibuprofen) and acetaminophen. Prescription medicines include muscle relaxers, antidepressants, and steroids.  Pain may be managed without any medicines.  Some ways to relieve pain include massage, aromatherapy, or meditation. Physical or occupational therapy may also help.    For more information:   Drug Enforcement Administration  [address] [facility] [place] [address]  Phone: 4- 883 - [phone]  Web Address: https://www.deadiversion.[facility].gov/drug_disposal/     Food and Drug Administration  [address] [place] [address]  Phone: 0- 608 - 459-0115  Web Address: http://www.fda.gov     © Copyright [employer] 2018 Information is for End User's use only and may not be sold, redistributed or otherwise used for commercial purposes. All illustrations and images included in CareNotes® are the copyrighted property of A.D.A.M., Inc. or [employer]

## 2024-12-05 NOTE — ASSESSMENT & PLAN NOTE
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  Takes tramadol for breakthrough pain.  No further follow-up scheduled with Ortho unless pain does not improve.

## 2024-12-05 NOTE — ASSESSMENT & PLAN NOTE
Lab Results   Component Value Date    HGBA1C 5.9 (H) 11/22/2024     Follows with endocrinology   A1C stable   Eye exam- Dr. Jennifer Seaman 1.5 mg weekly  Novolog TID   Lantus 20 at night  Metformin 500 mg twice daily

## 2024-12-05 NOTE — PROGRESS NOTES
Name: Juan Medley      : 1955      MRN: [id number]  Encounter Provider: Javid Vang MD  Encounter Date: 2024   Encounter department: [facility]    Assessment & Plan  Hypertension associated with type 2 diabetes mellitus  (HCC)    Lab Results   Component Value Date    HGBA1C 5.9 (H) 2024     BP Readings from Last 3 Encounters:   24 122/60   24 112/66   23 116/74     Blood pressure remains well-controlled.  Continue metoprolol 12.5 mg BID,   telmisartan 40 mg.  Lab work reviewed  Chlorthalidone discontinued due to hypotension and hypercalcemia.        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  Takes tramadol for breakthrough pain.  No further follow-up scheduled with Ortho unless pain does not improve.         Mixed hyperlipidemia  Stable. Continue Atorvastatin 10 mg daily        Sleep deprivation         Coronary artery disease involving native coronary artery of native heart without angina pectoris         Acquired hypothyroidism         Hypercalcemia  Due for repeat labs. Chlorthalidone discontinued.          Type 2 diabetes mellitus with complication, with long-term current use of insulin (HCC)    Lab Results   Component Value Date    HGBA1C 5.9 (H) 2024     Follows with endocrinology   A1C stable   Eye exam- Dr. Jennifer Seaman 1.5 mg weekly  Novolog TID   Lantus 20 at night  Metformin 500 mg twice daily         Medicare annual wellness visit, subsequent            AWV and follow-up completed today.  Reviewed previous labs.  Chronic conditions stable.  Continue current medications.    Preventive health issues were discussed with patient, and age appropriate screening tests were ordered as noted in patient's After Visit Summary. Personalized health advice and appropriate referrals for health education or preventive services given if needed, as noted in  patient's After Visit Summary.    History of Present Illness     AWV and follow up.  Recently saw endocrinology.  Due for repeat blood work.  Noted to have hyper calcium Rylee likely secondary to chlorthalidone.  This has been discontinued.  Blood pressure remains well-controlled.  Underwent CMC joint replacement May 2024 unfortunately continues to have pain in this joint.  Taking tramadol for breakthrough pain.  Stable on remainder of medications.  Screenings up-to-date       Patient Care Team:  Javid Vang MD as PCP - General (Family Medicine)  Tanya Munson MD as PCP - PCP-[facility]  Tanisha Goncalves MD as Endoscopist    Review of Systems   Constitutional:  Negative for activity change, fatigue and fever.   Eyes:  Negative for visual disturbance.   Respiratory:  Negative for shortness of breath.    Cardiovascular:  Negative for chest pain.   Gastrointestinal:  Negative for abdominal pain, constipation, diarrhea and nausea.   Endocrine: Negative for cold intolerance and heat intolerance.   Musculoskeletal:  Negative for back pain.        Left CMC joint pain   Skin:  Negative for rash.   Neurological:  Negative for headaches.   Psychiatric/Behavioral:  Negative for confusion.      Medical History Reviewed by provider this encounter:  Tobacco  Allergies  Meds  Problems  Med Hx  Surg Hx  Fam Hx       Annual Wellness Visit Questionnaire   Juan is here for his Subsequent Wellness visit. Last Medicare Wellness visit information reviewed, patient interviewed and updates made to the record today.      Health Risk Assessment:   Patient rates overall health as very good. Patient feels that their physical health rating is slightly better. Patient is satisfied with their life. Eyesight was rated as same. Hearing was rated as same. Patient feels that their emotional and mental health rating is slightly better. Patients states they are never, rarely angry. Patient states they  are never, rarely unusually tired/fatigued. Pain experienced in the last 7 days has been some. Patient's pain rating has been 6/10. Patient states that he has experienced no weight loss or gain in last 6 months.     Depression Screening:   PHQ-2 Score: 0      Fall Risk Screening:   In the past year, patient has experienced: no history of falling in past year      Home Safety:  Patient does not have trouble with stairs inside or outside of their home. Patient has working smoke alarms and has working carbon monoxide detector. Home safety hazards include: none.     Nutrition:   Current diet is Diabetic, Low Carb and No Added Salt.     Medications:   Patient is not currently taking any over-the-counter supplements. Patient is able to manage medications.     Activities of Daily Living (ADLs)/Instrumental Activities of Daily Living (IADLs):   Walk and transfer into and out of bed and chair?: Yes  Dress and groom yourself?: Yes    Bathe or shower yourself?: Yes    Feed yourself? Yes  Do your laundry/housekeeping?: Yes  Manage your money, pay your bills and track your expenses?: Yes  Make your own meals?: Yes    Do your own shopping?: Yes    Previous Hospitalizations:   Any hospitalizations or ED visits within the last 12 months?: No      Advance Care Planning:   Living will: Yes    Durable POA for healthcare: Yes    Advanced directive: Yes      PREVENTIVE SCREENINGS      Cardiovascular Screening:    General: History Lipid Disorder and Screening Current      Diabetes Screening:     General: History Diabetes and Screening Current      Colorectal Cancer Screening:     General: Screening Current      Abdominal Aortic Aneurysm (AAA) Screening:    Risk factors include: age between 65-76 yo        Lung Cancer Screening:     General: Screening Not Indicated      Hepatitis C Screening:    General: Screening Current    Screening, Brief Intervention, and Referral to Treatment (SBIRT)    Screening  Typical number of drinks in a day:  0  Typical number of drinks in a week: 0  Interpretation: Low risk drinking behavior.    AUDIT-C Screenin) How often did you have a drink containing alcohol in the past year? never  2) How many drinks did you have on a typical day when you were drinking in the past year? 0  3) How often did you have 6 or more drinks on one occasion in the past year? never    AUDIT-C Score: 0  Interpretation: Score 0-3 (male): Negative screen for alcohol misuse    Single Item Drug Screening:  How often have you used an illegal drug (including marijuana) or a prescription medication for non-medical reasons in the past year? never    Single Item Drug Screen Score: 0  Interpretation: Negative screen for possible drug use disorder    Annual Depression Screening  Time spent screening and evaluating the patient for depression during today's encounter was 5 minutes.    Review of Current Opioid Use    Opioid Risk Tool (ORT) Interpretation: Complete Opioid Risk Tool (ORT)    Social Drivers of Health     Financial Resource Strain: Low Risk  (2023)    Overall Financial Resource Strain (CARDIA)     Difficulty of Paying Living Expenses: Not hard at all   Food Insecurity: No Food Insecurity (2024)    Hunger Vital Sign     Worried About Running Out of Food in the Last Year: Never true     Ran Out of Food in the Last Year: Never true   Transportation Needs: No Transportation Needs (2024)    PRAPARE - Transportation     Lack of Transportation (Medical): No     Lack of Transportation (Non-Medical): No   Housing Stability: Low Risk  (2024)    Housing Stability Vital Sign     Unable to Pay for Housing in the Last Year: No     Number of Times Moved in the Last Year: 0     Homeless in the Last Year: No   Utilities: Not At Risk (2024)    [facility] Utilities     Threatened with loss of utilities: No     No results found.    Objective   /60 (BP Location: Left arm, Patient Position: Sitting, Cuff Size: Large)   Pulse 72   Temp  "97.7 °F (36.5 °C) (Temporal)   Resp 20   Ht 5' 9\" (1.753 m)   Wt 75.3 kg (166 lb)   SpO2 99%   BMI 24.51 kg/m²     Physical Exam  Vitals and nursing note reviewed.   Constitutional:       Appearance: Normal appearance. He is well-developed.   HENT:      Head: Normocephalic and atraumatic.   Cardiovascular:      Rate and Rhythm: Normal rate and regular rhythm.      Pulses: no weak pulses.           Dorsalis pedis pulses are 2+ on the right side and 2+ on the left side.        Posterior tibial pulses are 2+ on the right side and 2+ on the left side.   Pulmonary:      Effort: Pulmonary effort is normal.      Breath sounds: Normal breath sounds.   Abdominal:      General: Bowel sounds are normal.      Palpations: Abdomen is soft.   Musculoskeletal:      Cervical back: Normal range of motion.   Feet:      Right foot:      Skin integrity: No ulcer, skin breakdown, erythema, warmth, callus or dry skin.      Left foot:      Skin integrity: No ulcer, skin breakdown, erythema, warmth, callus or dry skin.   Skin:     General: Skin is warm.   Neurological:      General: No focal deficit present.      Mental Status: He is alert.   Psychiatric:         Mood and Affect: Mood normal.         Speech: Speech normal.         Patient's shoes and socks removed.    Right Foot/Ankle   Right Foot Inspection  Skin Exam: skin normal and skin intact. No dry skin, no warmth, no callus, no erythema, no maceration, no abnormal color, no pre-ulcer, no ulcer and no callus.     Toe Exam: ROM and strength within normal limits.     Sensory   Monofilament testing: intact    Vascular  The right DP pulse is 2+. The right PT pulse is 2+.     Left Foot/Ankle  Left Foot Inspection  Skin Exam: skin normal and skin intact. No dry skin, no warmth, no erythema, no maceration, normal color, no pre-ulcer, no ulcer and no callus.     Toe Exam: ROM and strength within normal limits.     Sensory   Monofilament testing: intact    Vascular  The left DP pulse is " 2+. The left PT pulse is 2+.     Assign Risk Category  No deformity present  No loss of protective sensation  No weak pulses  Risk: 0

## 2024-12-05 NOTE — ASSESSMENT & PLAN NOTE
Lab Results   Component Value Date    HGBA1C 5.9 (H) 11/22/2024     BP Readings from Last 3 Encounters:   12/05/24 122/60   06/05/24 112/66   11/30/23 116/74     Blood pressure remains well-controlled.  Continue metoprolol 12.5 mg BID,   telmisartan 40 mg.  Lab work reviewed  Chlorthalidone discontinued due to hypotension and hypercalcemia.

## 2024-12-14 DIAGNOSIS — Z79.4 TYPE 2 DIABETES MELLITUS WITH COMPLICATION, WITH LONG-TERM CURRENT USE OF INSULIN (HCC): ICD-10-CM

## 2024-12-14 DIAGNOSIS — E11.8 TYPE 2 DIABETES MELLITUS WITH COMPLICATION, WITH LONG-TERM CURRENT USE OF INSULIN (HCC): ICD-10-CM

## 2024-12-15 DIAGNOSIS — E11.8 TYPE 2 DIABETES MELLITUS WITH COMPLICATION, WITH LONG-TERM CURRENT USE OF INSULIN (HCC): ICD-10-CM

## 2024-12-15 DIAGNOSIS — Z79.4 TYPE 2 DIABETES MELLITUS WITH COMPLICATION, WITH LONG-TERM CURRENT USE OF INSULIN (HCC): ICD-10-CM

## 2024-12-15 RX ORDER — ATORVASTATIN CALCIUM 10 MG/1
10 TABLET, FILM COATED ORAL DAILY
Qty: 90 TABLET | Refills: 1 | Status: SHIPPED | OUTPATIENT
Start: 2024-12-15

## 2024-12-17 RX ORDER — FENOFIBRATE 48 MG/1
48 TABLET, COATED ORAL DAILY
Qty: 30 TABLET | Refills: 0 | Status: SHIPPED | OUTPATIENT
Start: 2024-12-17

## 2025-01-17 DIAGNOSIS — Z79.4 TYPE 2 DIABETES MELLITUS WITH COMPLICATION, WITH LONG-TERM CURRENT USE OF INSULIN (HCC): ICD-10-CM

## 2025-01-17 DIAGNOSIS — E11.8 TYPE 2 DIABETES MELLITUS WITH COMPLICATION, WITH LONG-TERM CURRENT USE OF INSULIN (HCC): ICD-10-CM

## 2025-01-17 RX ORDER — FENOFIBRATE 48 MG/1
48 TABLET, COATED ORAL DAILY
Qty: 90 TABLET | Refills: 1 | Status: SHIPPED | OUTPATIENT
Start: 2025-01-17

## 2025-01-30 DIAGNOSIS — I25.10 CORONARY ARTERY DISEASE INVOLVING NATIVE CORONARY ARTERY OF NATIVE HEART WITHOUT ANGINA PECTORIS: ICD-10-CM

## 2025-01-30 DIAGNOSIS — I10 BENIGN ESSENTIAL HYPERTENSION: ICD-10-CM

## 2025-01-30 RX ORDER — METOPROLOL SUCCINATE 25 MG/1
25 TABLET, EXTENDED RELEASE ORAL
Qty: 90 TABLET | Refills: 1 | Status: SHIPPED | OUTPATIENT
Start: 2025-01-30

## 2025-03-03 ENCOUNTER — PATIENT MESSAGE (OUTPATIENT)
Dept: FAMILY MEDICINE CLINIC | Facility: CLINIC | Age: 70
End: 2025-03-03

## 2025-03-04 DIAGNOSIS — Z72.820 SLEEP DEPRIVATION: ICD-10-CM

## 2025-03-04 RX ORDER — ARMODAFINIL 200 MG/1
200 TABLET ORAL DAILY
Qty: 90 TABLET | Refills: 0 | Status: SHIPPED | OUTPATIENT
Start: 2025-03-04 | End: 2025-06-02

## 2025-03-05 ENCOUNTER — TELEPHONE (OUTPATIENT)
Age: 70
End: 2025-03-05

## 2025-03-05 NOTE — TELEPHONE ENCOUNTER
PA for ARMODAFINIL 200MG SUBMITTED to [employer]    via      [email or web address]-Case ID #     [x]PA sent as URGENT    All office notes, labs and other pertaining documents and studies sent. Clinical questions answered. Awaiting determination from insurance company.     Turnaround time for your insurance to make a decision on your Prior Authorization can take 7-21 business days.

## 2025-06-04 ENCOUNTER — TELEPHONE (OUTPATIENT)
Age: 70
End: 2025-06-04

## 2025-06-04 ENCOUNTER — TELEPHONE (OUTPATIENT)
Dept: ADMINISTRATIVE | Facility: OTHER | Age: 70
End: 2025-06-04

## 2025-06-04 NOTE — TELEPHONE ENCOUNTER
Patient calling asking if he needed bloodwork for his appointment tomorrow.  I did tell him that there are no current orders and to still keep appointment for tomorrow.

## 2025-06-04 NOTE — TELEPHONE ENCOUNTER
06/04/25 3:17 PM    Patient contacted to bring Advance Directive, POLST, or Living Will document to next scheduled pcp visit.[facility] Department left message.    Thank you.  Martha Tirado MA  [facility]

## 2025-06-05 ENCOUNTER — OFFICE VISIT (OUTPATIENT)
Dept: FAMILY MEDICINE CLINIC | Facility: CLINIC | Age: 70
End: 2025-06-05
Payer: MEDICARE

## 2025-06-05 VITALS
HEART RATE: 85 BPM | RESPIRATION RATE: 16 BRPM | TEMPERATURE: 98.2 F | WEIGHT: 163.5 LBS | BODY MASS INDEX: 24.14 KG/M2 | SYSTOLIC BLOOD PRESSURE: 118 MMHG | DIASTOLIC BLOOD PRESSURE: 70 MMHG | OXYGEN SATURATION: 95 %

## 2025-06-05 DIAGNOSIS — D35.2 PITUITARY MICROADENOMA WITH HYPERPROLACTINEMIA (HCC): ICD-10-CM

## 2025-06-05 DIAGNOSIS — Z72.820 SLEEP DEPRIVATION: Primary | ICD-10-CM

## 2025-06-05 DIAGNOSIS — S46.011D TRAUMATIC COMPLETE TEAR OF RIGHT ROTATOR CUFF, SUBSEQUENT ENCOUNTER: ICD-10-CM

## 2025-06-05 DIAGNOSIS — E22.9 PITUITARY MICROADENOMA WITH HYPERPROLACTINEMIA (HCC): ICD-10-CM

## 2025-06-05 DIAGNOSIS — E11.59 HYPERTENSION ASSOCIATED WITH TYPE 2 DIABETES MELLITUS  (HCC): ICD-10-CM

## 2025-06-05 DIAGNOSIS — I15.2 HYPERTENSION ASSOCIATED WITH TYPE 2 DIABETES MELLITUS  (HCC): ICD-10-CM

## 2025-06-05 PROBLEM — M75.121 COMPLETE ROTATOR CUFF TEAR OR RUPTURE OF RIGHT SHOULDER, NOT SPECIFIED AS TRAUMATIC: Status: ACTIVE | Noted: 2025-05-09

## 2025-06-05 PROCEDURE — 99214 OFFICE O/P EST MOD 30 MIN: CPT | Performed by: FAMILY MEDICINE

## 2025-06-05 PROCEDURE — G2211 COMPLEX E/M VISIT ADD ON: HCPCS | Performed by: FAMILY MEDICINE

## 2025-06-05 RX ORDER — TRAMADOL HYDROCHLORIDE 50 MG/1
50 TABLET ORAL EVERY 6 HOURS PRN
Qty: 30 TABLET | Refills: 0 | Status: SHIPPED | OUTPATIENT
Start: 2025-06-05

## 2025-06-05 RX ORDER — ARMODAFINIL 200 MG/1
200 TABLET ORAL DAILY
Qty: 90 TABLET | Refills: 0 | Status: SHIPPED | OUTPATIENT
Start: 2025-06-05 | End: 2025-09-03

## 2025-06-05 RX ORDER — TELMISARTAN 40 MG/1
TABLET ORAL
COMMUNITY
Start: 2024-11-08

## 2025-06-05 NOTE — ASSESSMENT & PLAN NOTE
Stable on armodafinil 200 mg daily.  PDMP reviewed.  Medication refilled  Orders:    Armodafinil 200 MG TABS; Take 1 tablet (200 mg total) by mouth in the morning

## 2025-06-05 NOTE — ASSESSMENT & PLAN NOTE
Continue physical therapy.  Unable to tolerate Percocet as this makes him feel loopy during work.  Was interested in Journavax unlikely be covered by insurance.  Trial patient on tramadol as needed for breakthrough pain.  Orders:    traMADol (Ultram) 50 mg tablet; Take 1 tablet (50 mg total) by mouth every 6 (six) hours as needed for moderate pain

## 2025-06-05 NOTE — ASSESSMENT & PLAN NOTE
Lab Results   Component Value Date    HGBA1C 7 (H) 04/16/2025     BP Readings from Last 3 Encounters:   06/05/25 118/70   12/05/24 122/60   06/05/24 112/66     Blood pressure remains well-controlled.  Continue metoprolol 12.5 mg BID,   telmisartan 40 mg.  Lab work reviewed  Chlorthalidone discontinued due to hypotension and hypercalcemia.

## 2025-06-05 NOTE — PROGRESS NOTES
Name: Juan Medley      : 1955      MRN: [id number]  Encounter Provider: Javid Vang MD  Encounter Date: 2025   Encounter department: [facility]  :  Assessment & Plan  Sleep deprivation  Stable on armodafinil 200 mg daily.  PDMP reviewed.  Medication refilled  Orders:    Armodafinil 200 MG TABS; Take 1 tablet (200 mg total) by mouth in the morning    Pituitary microadenoma with hyperprolactinemia (HCC)  Stable.  Following with endocrinology       Hypertension associated with type 2 diabetes mellitus  (HCC)    Lab Results   Component Value Date    HGBA1C 7 (H) 2025     BP Readings from Last 3 Encounters:   25 118/70   24 122/60   24 112/66     Blood pressure remains well-controlled.  Continue metoprolol 12.5 mg BID,   telmisartan 40 mg.  Lab work reviewed  Chlorthalidone discontinued due to hypotension and hypercalcemia.          Traumatic complete tear of right rotator cuff, subsequent encounter  Continue physical therapy.  Unable to tolerate Percocet as this makes him feel loopy during work.  Was interested in Journavax unlikely be covered by insurance.  Trial patient on tramadol as needed for breakthrough pain.  Orders:    traMADol (Ultram) 50 mg tablet; Take 1 tablet (50 mg total) by mouth every 6 (six) hours as needed for moderate pain           History of Present Illness   Patient presents today for 6-month follow-up.  Overall doing well.  Needs refill medications.  Continues to follow with endocrinology.  Status post right rotator cuff repair.  Percocet is making him loopy during work.  Blood work is up-to-date.  Continues to follow with endocrinology.    Medication Refill  Pertinent negatives include no abdominal pain, chest pain, fatigue, fever, headaches, nausea or rash.     Review of Systems   Constitutional:  Negative for activity change, fatigue and fever.   Eyes:  Negative for visual disturbance.   Respiratory:  Negative for  shortness of breath.    Cardiovascular:  Negative for chest pain.   Gastrointestinal:  Negative for abdominal pain, constipation, diarrhea and nausea.   Endocrine: Negative for cold intolerance and heat intolerance.   Musculoskeletal:  Negative for back pain.        Right shoulder pain.  Status post surgery.   Skin:  Negative for rash.   Neurological:  Negative for headaches.   Psychiatric/Behavioral:  Negative for confusion.        Objective   /70 (BP Location: Left arm, Patient Position: Sitting, Cuff Size: Large)   Pulse 85   Temp 98.2 °F (36.8 °C) (Temporal)   Resp 16   Wt 74.2 kg (163 lb 8 oz)   SpO2 95%   BMI 24.14 kg/m²      Physical Exam  Vitals and nursing note reviewed.   Constitutional:       Appearance: Normal appearance. He is well-developed.   HENT:      Head: Normocephalic and atraumatic.     Cardiovascular:      Rate and Rhythm: Normal rate and regular rhythm.   Pulmonary:      Effort: Pulmonary effort is normal.      Breath sounds: Normal breath sounds.   Abdominal:      General: Bowel sounds are normal.      Palpations: Abdomen is soft.     Musculoskeletal:      Cervical back: Normal range of motion.      Comments: Sling right arm status post right rotator cuff repair     Skin:     General: Skin is warm.     Neurological:      General: No focal deficit present.      Mental Status: He is alert.     Psychiatric:         Mood and Affect: Mood normal.         Speech: Speech normal.

## 2025-06-06 ENCOUNTER — TELEPHONE (OUTPATIENT)
Age: 70
End: 2025-06-06

## 2025-06-06 NOTE — TELEPHONE ENCOUNTER
PA Armodafinil 200 MG SUBMITTED    to [email or web address]     via    []CMM-KEY:    [x]Surescripts-Case ID # [id number]   []Availity-Auth ID #  NDC #    []Faxed to plan   []Other website    []Phone call Case ID #      []PA sent as URGENT    All office notes, labs and other pertaining documents and studies sent. Clinical questions answered. Awaiting determination from insurance company.     Turnaround time for your insurance to make a decision on your Prior Authorization can take 7-21 business days.

## 2025-06-09 NOTE — TELEPHONE ENCOUNTER
PA Armodafinil 200 MG  DENIED    Reason:(Screenshot if applicable)        Message sent to office clinical pool Yes    Denial letter scanned into Media Yes    We can gladly do an appeal but the process can take about 30-60 days to provide determination. Please have the office staff schedule a Peer to Peer at phone [phone]. If an appeal is truly warranted please have Provider send clinical documentation to the PA department to support the appeal.     **Please follow up with your patient regarding denial and next steps**

## 2025-07-23 DIAGNOSIS — I25.10 CORONARY ARTERY DISEASE INVOLVING NATIVE CORONARY ARTERY OF NATIVE HEART WITHOUT ANGINA PECTORIS: ICD-10-CM

## 2025-07-23 DIAGNOSIS — Z79.4 TYPE 2 DIABETES MELLITUS WITH COMPLICATION, WITH LONG-TERM CURRENT USE OF INSULIN (HCC): ICD-10-CM

## 2025-07-23 DIAGNOSIS — E11.8 TYPE 2 DIABETES MELLITUS WITH COMPLICATION, WITH LONG-TERM CURRENT USE OF INSULIN (HCC): ICD-10-CM

## 2025-07-23 DIAGNOSIS — I10 BENIGN ESSENTIAL HYPERTENSION: ICD-10-CM

## 2025-07-24 RX ORDER — METOPROLOL SUCCINATE 25 MG/1
25 TABLET, EXTENDED RELEASE ORAL
Qty: 90 TABLET | Refills: 1 | Status: SHIPPED | OUTPATIENT
Start: 2025-07-24

## 2025-07-24 RX ORDER — FENOFIBRATE 48 MG/1
48 TABLET, FILM COATED ORAL DAILY
Qty: 90 TABLET | Refills: 0 | Status: SHIPPED | OUTPATIENT
Start: 2025-07-24

## 2025-08-01 ENCOUNTER — TELEPHONE (OUTPATIENT)
Dept: FAMILY MEDICINE CLINIC | Facility: CLINIC | Age: 70
End: 2025-08-01